# Patient Record
Sex: FEMALE | Race: ASIAN | NOT HISPANIC OR LATINO | URBAN - METROPOLITAN AREA
[De-identification: names, ages, dates, MRNs, and addresses within clinical notes are randomized per-mention and may not be internally consistent; named-entity substitution may affect disease eponyms.]

---

## 2017-11-28 ENCOUNTER — OUTPATIENT (OUTPATIENT)
Dept: OUTPATIENT SERVICES | Facility: HOSPITAL | Age: 27
LOS: 1 days | Discharge: HOME | End: 2017-11-28

## 2019-06-10 ENCOUNTER — EMERGENCY (EMERGENCY)
Facility: HOSPITAL | Age: 29
LOS: 1 days | Discharge: HOME | End: 2019-06-10
Attending: EMERGENCY MEDICINE | Admitting: EMERGENCY MEDICINE
Payer: COMMERCIAL

## 2019-06-10 VITALS
RESPIRATION RATE: 189 BRPM | HEART RATE: 110 BPM | OXYGEN SATURATION: 98 % | DIASTOLIC BLOOD PRESSURE: 90 MMHG | TEMPERATURE: 98 F | SYSTOLIC BLOOD PRESSURE: 135 MMHG

## 2019-06-10 VITALS
HEART RATE: 82 BPM | OXYGEN SATURATION: 99 % | TEMPERATURE: 97 F | DIASTOLIC BLOOD PRESSURE: 82 MMHG | SYSTOLIC BLOOD PRESSURE: 140 MMHG | RESPIRATION RATE: 16 BRPM
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
## 2019-06-10 PROCEDURE — 99284 EMERGENCY DEPT VISIT MOD MDM: CPT

## 2019-06-10 RX ORDER — SODIUM CHLORIDE 9 MG/ML
3 INJECTION INTRAMUSCULAR; INTRAVENOUS; SUBCUTANEOUS ONCE
Refills: 0 | Status: COMPLETED | OUTPATIENT
Start: 2019-06-10 | End: 2019-06-10

## 2019-06-10 RX ORDER — DIPHENHYDRAMINE HCL 50 MG
1 CAPSULE ORAL
Qty: 12 | Refills: 0
Start: 2019-06-10 | End: 2019-06-12

## 2019-06-10 RX ORDER — ONDANSETRON 8 MG/1
1 TABLET, FILM COATED ORAL
Qty: 12 | Refills: 0
Start: 2019-06-10 | End: 2019-06-12

## 2019-06-10 RX ORDER — FAMOTIDINE 10 MG/ML
1 INJECTION INTRAVENOUS
Qty: 10 | Refills: 0
Start: 2019-06-10 | End: 2019-06-14

## 2019-06-10 RX ORDER — DIPHENHYDRAMINE HCL 50 MG
25 CAPSULE ORAL ONCE
Refills: 0 | Status: COMPLETED | OUTPATIENT
Start: 2019-06-10 | End: 2019-06-10

## 2019-06-10 RX ORDER — ONDANSETRON 8 MG/1
4 TABLET, FILM COATED ORAL ONCE
Refills: 0 | Status: COMPLETED | OUTPATIENT
Start: 2019-06-10 | End: 2019-06-10

## 2019-06-10 RX ORDER — SODIUM CHLORIDE 9 MG/ML
1000 INJECTION INTRAMUSCULAR; INTRAVENOUS; SUBCUTANEOUS ONCE
Refills: 0 | Status: COMPLETED | OUTPATIENT
Start: 2019-06-10 | End: 2019-06-10

## 2019-06-10 RX ORDER — FAMOTIDINE 10 MG/ML
20 INJECTION INTRAVENOUS ONCE
Refills: 0 | Status: COMPLETED | OUTPATIENT
Start: 2019-06-10 | End: 2019-06-10

## 2019-06-10 RX ADMIN — Medication 125 MILLIGRAM(S): at 13:47

## 2019-06-10 RX ADMIN — SODIUM CHLORIDE 3 MILLILITER(S): 9 INJECTION INTRAMUSCULAR; INTRAVENOUS; SUBCUTANEOUS at 14:00

## 2019-06-10 RX ADMIN — ONDANSETRON 4 MILLIGRAM(S): 8 TABLET, FILM COATED ORAL at 13:48

## 2019-06-10 RX ADMIN — Medication 25 MILLIGRAM(S): at 13:20

## 2019-06-10 RX ADMIN — SODIUM CHLORIDE 1000 MILLILITER(S): 9 INJECTION INTRAMUSCULAR; INTRAVENOUS; SUBCUTANEOUS at 14:30

## 2019-06-10 RX ADMIN — FAMOTIDINE 20 MILLIGRAM(S): 10 INJECTION INTRAVENOUS at 13:47

## 2019-06-10 RX ADMIN — SODIUM CHLORIDE 1000 MILLILITER(S): 9 INJECTION INTRAMUSCULAR; INTRAVENOUS; SUBCUTANEOUS at 13:48

## 2019-06-10 NOTE — ED PROVIDER NOTE - OBJECTIVE STATEMENT
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 Patient reports she was eating a sandwich approximately 20 minutes PTA when her mouth began feeling tingly, and felt nauseous with mild redness to the skin. Denies any SOB, cough, wheeze, tongue swelling, vomiting, itching, or hives. States she asked the , who informed her that the sandwich contained nut products, and took 2 tabs 50mg Benadryl PTA. Patient currently feels nauseous. States she did not use an epi pen today, as she only uses it if throat becomes swollen. Also denies any recent changes in medications.

## 2019-06-10 NOTE — ED PROVIDER NOTE - CLINICAL SUMMARY MEDICAL DECISION MAKING FREE TEXT BOX
Findings consistent with mild allergic reaction. Will observe in ED for minimum of 2 hours, provide H1-H2 blockers, Zofran, and steroids. Inquired if patient has epi pen, and patient confirmed that she does but only uses it if her throat is swollen and has difficulty swallowing. No indication for epinephrine stick in ED. Findings consistent with mild allergic reaction. Will observe in ED for minimum of 2 hours, provide H1-H2 blockers and steroids. Inquired if patient has epi pen, and patient confirmed that she does but only uses it if her throat is swollen and has difficulty swallowing. No indication for epinephrine stick in ED.

## 2019-06-10 NOTE — ED ADULT TRIAGE NOTE - CHIEF COMPLAINT QUOTE
Allergic reaction to nuts pt. denies any airway involvement at this time. Took 2 benadryl prior to arrival

## 2019-06-10 NOTE — ED PROVIDER NOTE - ENMT, MLM
Airway patent, Nasal mucosa clear. Mouth with normal mucosa. Throat has no vesicles, no oropharyngeal exudates and uvula is midline and normal. Mild circumoral angioedema. Tongue is normal. No stridor, no pooling of secretions.

## 2019-06-10 NOTE — ED PROVIDER NOTE - PROGRESS NOTE DETAILS
Patient with 1 episode of emesis in the ED. Currently reports feeling better, rash has faded, symptoms have resolved. Will continue to observe patient pending half-life of meds. Patient remains hemodynamically stable, with O2 sat of 100% on RA. all symptoms resolved, observed and no further complaints. Stable for dc.

## 2019-06-14 DIAGNOSIS — T78.1XXA OTHER ADVERSE FOOD REACTIONS, NOT ELSEWHERE CLASSIFIED, INITIAL ENCOUNTER: ICD-10-CM

## 2019-06-14 DIAGNOSIS — R11.0 NAUSEA: ICD-10-CM

## 2019-08-13 ENCOUNTER — INPATIENT (INPATIENT)
Facility: HOSPITAL | Age: 29
LOS: 0 days | Discharge: HOME | DRG: 872 | End: 2019-08-14
Payer: COMMERCIAL

## 2019-08-13 VITALS
HEART RATE: 101 BPM | RESPIRATION RATE: 18 BRPM | DIASTOLIC BLOOD PRESSURE: 85 MMHG | WEIGHT: 175.05 LBS | SYSTOLIC BLOOD PRESSURE: 135 MMHG | TEMPERATURE: 99 F | OXYGEN SATURATION: 97 %

## 2019-08-13 DIAGNOSIS — G80.9 CEREBRAL PALSY, UNSPECIFIED: ICD-10-CM

## 2019-08-13 DIAGNOSIS — K52.9 NONINFECTIVE GASTROENTERITIS AND COLITIS, UNSPECIFIED: ICD-10-CM

## 2019-08-13 DIAGNOSIS — Z98.890 OTHER SPECIFIED POSTPROCEDURAL STATES: Chronic | ICD-10-CM

## 2019-08-13 DIAGNOSIS — R63.8 OTHER SYMPTOMS AND SIGNS CONCERNING FOOD AND FLUID INTAKE: ICD-10-CM

## 2019-08-13 DIAGNOSIS — A41.9 SEPSIS, UNSPECIFIED ORGANISM: ICD-10-CM

## 2019-08-13 DIAGNOSIS — F32.9 MAJOR DEPRESSIVE DISORDER, SINGLE EPISODE, UNSPECIFIED: ICD-10-CM

## 2019-08-13 DIAGNOSIS — Z91.89 OTHER SPECIFIED PERSONAL RISK FACTORS, NOT ELSEWHERE CLASSIFIED: ICD-10-CM

## 2019-08-13 LAB
ALBUMIN SERPL ELPH-MCNC: 3.7 G/DL — SIGNIFICANT CHANGE UP (ref 3.3–5)
ALP SERPL-CCNC: 90 U/L — SIGNIFICANT CHANGE UP (ref 40–120)
ALT FLD-CCNC: SIGNIFICANT CHANGE UP U/L (ref 10–45)
ANION GAP SERPL CALC-SCNC: 15 MMOL/L — SIGNIFICANT CHANGE UP (ref 5–17)
APPEARANCE UR: CLEAR — SIGNIFICANT CHANGE UP
AST SERPL-CCNC: SIGNIFICANT CHANGE UP U/L (ref 10–40)
BASE EXCESS BLDV CALC-SCNC: -14.8 MMOL/L — SIGNIFICANT CHANGE UP
BASOPHILS # BLD AUTO: 0.03 K/UL — SIGNIFICANT CHANGE UP (ref 0–0.2)
BASOPHILS NFR BLD AUTO: 0.2 % — SIGNIFICANT CHANGE UP (ref 0–2)
BILIRUB SERPL-MCNC: <0.2 MG/DL — SIGNIFICANT CHANGE UP (ref 0.2–1.2)
BILIRUB UR-MCNC: NEGATIVE — SIGNIFICANT CHANGE UP
BUN SERPL-MCNC: 9 MG/DL — SIGNIFICANT CHANGE UP (ref 7–23)
CA-I SERPL-SCNC: 0.74 MMOL/L — LOW (ref 1.12–1.3)
CALCIUM SERPL-MCNC: 8.8 MG/DL — SIGNIFICANT CHANGE UP (ref 8.4–10.5)
CHLORIDE SERPL-SCNC: 109 MMOL/L — HIGH (ref 96–108)
CO2 SERPL-SCNC: 16 MMOL/L — LOW (ref 22–31)
COLOR SPEC: YELLOW — SIGNIFICANT CHANGE UP
CREAT SERPL-MCNC: 0.63 MG/DL — SIGNIFICANT CHANGE UP (ref 0.5–1.3)
DIFF PNL FLD: ABNORMAL
EOSINOPHIL # BLD AUTO: 0.04 K/UL — SIGNIFICANT CHANGE UP (ref 0–0.5)
EOSINOPHIL NFR BLD AUTO: 0.3 % — SIGNIFICANT CHANGE UP (ref 0–6)
GAS PNL BLDV: 140 MMOL/L — SIGNIFICANT CHANGE UP (ref 138–146)
GAS PNL BLDV: SIGNIFICANT CHANGE UP
GLUCOSE SERPL-MCNC: 124 MG/DL — HIGH (ref 70–99)
GLUCOSE UR QL: NEGATIVE — SIGNIFICANT CHANGE UP
HCG SERPL-ACNC: <0 MIU/ML — SIGNIFICANT CHANGE UP
HCG UR QL: NEGATIVE — SIGNIFICANT CHANGE UP
HCO3 BLDV-SCNC: 11 MMOL/L — LOW (ref 20–27)
HCT VFR BLD CALC: 41.6 % — SIGNIFICANT CHANGE UP (ref 34.5–45)
HGB BLD-MCNC: 13.4 G/DL — SIGNIFICANT CHANGE UP (ref 11.5–15.5)
IMM GRANULOCYTES NFR BLD AUTO: 0.3 % — SIGNIFICANT CHANGE UP (ref 0–1.5)
KETONES UR-MCNC: NEGATIVE — SIGNIFICANT CHANGE UP
LACTATE SERPL-SCNC: 0.8 MMOL/L — SIGNIFICANT CHANGE UP (ref 0.5–2)
LEUKOCYTE ESTERASE UR-ACNC: ABNORMAL
LIDOCAIN IGE QN: 64 U/L — HIGH (ref 7–60)
LYMPHOCYTES # BLD AUTO: 14.7 % — SIGNIFICANT CHANGE UP (ref 13–44)
LYMPHOCYTES # BLD AUTO: 2.11 K/UL — SIGNIFICANT CHANGE UP (ref 1–3.3)
MCHC RBC-ENTMCNC: 28.2 PG — SIGNIFICANT CHANGE UP (ref 27–34)
MCHC RBC-ENTMCNC: 32.2 GM/DL — SIGNIFICANT CHANGE UP (ref 32–36)
MCV RBC AUTO: 87.6 FL — SIGNIFICANT CHANGE UP (ref 80–100)
MONOCYTES # BLD AUTO: 0.88 K/UL — SIGNIFICANT CHANGE UP (ref 0–0.9)
MONOCYTES NFR BLD AUTO: 6.1 % — SIGNIFICANT CHANGE UP (ref 2–14)
NEUTROPHILS # BLD AUTO: 11.28 K/UL — HIGH (ref 1.8–7.4)
NEUTROPHILS NFR BLD AUTO: 78.4 % — HIGH (ref 43–77)
NITRITE UR-MCNC: NEGATIVE — SIGNIFICANT CHANGE UP
NRBC # BLD: 0 /100 WBCS — SIGNIFICANT CHANGE UP (ref 0–0)
PCO2 BLDV: 24 MMHG — LOW (ref 41–51)
PH BLDV: 7.27 — LOW (ref 7.32–7.43)
PH UR: 6.5 — SIGNIFICANT CHANGE UP (ref 5–8)
PLATELET # BLD AUTO: 403 K/UL — HIGH (ref 150–400)
PO2 BLDV: 53 MMHG — SIGNIFICANT CHANGE UP
POTASSIUM BLDV-SCNC: 2.4 MMOL/L — CRITICAL LOW (ref 3.5–4.9)
POTASSIUM SERPL-MCNC: SIGNIFICANT CHANGE UP MMOL/L (ref 3.5–5.3)
POTASSIUM SERPL-SCNC: SIGNIFICANT CHANGE UP MMOL/L (ref 3.5–5.3)
PROT SERPL-MCNC: 8 G/DL — SIGNIFICANT CHANGE UP (ref 6–8.3)
PROT UR-MCNC: NEGATIVE MG/DL — SIGNIFICANT CHANGE UP
RBC # BLD: 4.75 M/UL — SIGNIFICANT CHANGE UP (ref 3.8–5.2)
RBC # FLD: 12.2 % — SIGNIFICANT CHANGE UP (ref 10.3–14.5)
SAO2 % BLDV: 84 % — SIGNIFICANT CHANGE UP
SODIUM SERPL-SCNC: 140 MMOL/L — SIGNIFICANT CHANGE UP (ref 135–145)
SP GR SPEC: <=1.005 — SIGNIFICANT CHANGE UP (ref 1–1.03)
UROBILINOGEN FLD QL: 0.2 E.U./DL — SIGNIFICANT CHANGE UP
WBC # BLD: 14.38 K/UL — HIGH (ref 3.8–10.5)
WBC # FLD AUTO: 14.38 K/UL — HIGH (ref 3.8–10.5)

## 2019-08-13 PROCEDURE — 74177 CT ABD & PELVIS W/CONTRAST: CPT | Mod: 26

## 2019-08-13 PROCEDURE — 99223 1ST HOSP IP/OBS HIGH 75: CPT | Mod: GC

## 2019-08-13 PROCEDURE — 99285 EMERGENCY DEPT VISIT HI MDM: CPT

## 2019-08-13 RX ORDER — FLUOXETINE HCL 10 MG
1 CAPSULE ORAL
Qty: 0 | Refills: 0 | DISCHARGE

## 2019-08-13 RX ORDER — TOPIRAMATE 25 MG
50 TABLET ORAL DAILY
Refills: 0 | Status: DISCONTINUED | OUTPATIENT
Start: 2019-08-13 | End: 2019-08-14

## 2019-08-13 RX ORDER — QUETIAPINE FUMARATE 200 MG/1
100 TABLET, FILM COATED ORAL DAILY
Refills: 0 | Status: DISCONTINUED | OUTPATIENT
Start: 2019-08-13 | End: 2019-08-14

## 2019-08-13 RX ORDER — LURASIDONE HYDROCHLORIDE 40 MG/1
1 TABLET ORAL
Qty: 0 | Refills: 0 | DISCHARGE

## 2019-08-13 RX ORDER — SODIUM CHLORIDE 9 MG/ML
1000 INJECTION INTRAMUSCULAR; INTRAVENOUS; SUBCUTANEOUS ONCE
Refills: 0 | Status: COMPLETED | OUTPATIENT
Start: 2019-08-13 | End: 2019-08-13

## 2019-08-13 RX ORDER — DIAZEPAM 5 MG
5 TABLET ORAL ONCE
Refills: 0 | Status: DISCONTINUED | OUTPATIENT
Start: 2019-08-13 | End: 2019-08-13

## 2019-08-13 RX ORDER — TOPIRAMATE 25 MG
1 TABLET ORAL
Qty: 0 | Refills: 0 | DISCHARGE

## 2019-08-13 RX ORDER — SODIUM CHLORIDE 9 MG/ML
1000 INJECTION INTRAMUSCULAR; INTRAVENOUS; SUBCUTANEOUS
Refills: 0 | Status: DISCONTINUED | OUTPATIENT
Start: 2019-08-13 | End: 2019-08-14

## 2019-08-13 RX ORDER — IOHEXOL 300 MG/ML
30 INJECTION, SOLUTION INTRAVENOUS ONCE
Refills: 0 | Status: COMPLETED | OUTPATIENT
Start: 2019-08-13 | End: 2019-08-13

## 2019-08-13 RX ORDER — FLUOXETINE HCL 10 MG
20 CAPSULE ORAL DAILY
Refills: 0 | Status: DISCONTINUED | OUTPATIENT
Start: 2019-08-13 | End: 2019-08-14

## 2019-08-13 RX ORDER — LURASIDONE HYDROCHLORIDE 40 MG/1
40 TABLET ORAL DAILY
Refills: 0 | Status: DISCONTINUED | OUTPATIENT
Start: 2019-08-13 | End: 2019-08-14

## 2019-08-13 RX ORDER — PIPERACILLIN AND TAZOBACTAM 4; .5 G/20ML; G/20ML
3.38 INJECTION, POWDER, LYOPHILIZED, FOR SOLUTION INTRAVENOUS EVERY 6 HOURS
Refills: 0 | Status: DISCONTINUED | OUTPATIENT
Start: 2019-08-14 | End: 2019-08-14

## 2019-08-13 RX ORDER — POTASSIUM CHLORIDE 20 MEQ
40 PACKET (EA) ORAL ONCE
Refills: 0 | Status: COMPLETED | OUTPATIENT
Start: 2019-08-13 | End: 2019-08-13

## 2019-08-13 RX ORDER — POTASSIUM CHLORIDE 20 MEQ
10 PACKET (EA) ORAL ONCE
Refills: 0 | Status: COMPLETED | OUTPATIENT
Start: 2019-08-13 | End: 2019-08-13

## 2019-08-13 RX ORDER — QUETIAPINE FUMARATE 200 MG/1
1 TABLET, FILM COATED ORAL
Qty: 0 | Refills: 0 | DISCHARGE

## 2019-08-13 RX ORDER — POTASSIUM CHLORIDE 20 MEQ
10 PACKET (EA) ORAL ONCE
Refills: 0 | Status: COMPLETED | OUTPATIENT
Start: 2019-08-13 | End: 2019-08-14

## 2019-08-13 RX ORDER — METOCLOPRAMIDE HCL 10 MG
10 TABLET ORAL ONCE
Refills: 0 | Status: COMPLETED | OUTPATIENT
Start: 2019-08-13 | End: 2019-08-13

## 2019-08-13 RX ORDER — PIPERACILLIN AND TAZOBACTAM 4; .5 G/20ML; G/20ML
3.38 INJECTION, POWDER, LYOPHILIZED, FOR SOLUTION INTRAVENOUS ONCE
Refills: 0 | Status: COMPLETED | OUTPATIENT
Start: 2019-08-13 | End: 2019-08-13

## 2019-08-13 RX ADMIN — Medication 10 MILLIGRAM(S): at 18:23

## 2019-08-13 RX ADMIN — IOHEXOL 30 MILLILITER(S): 300 INJECTION, SOLUTION INTRAVENOUS at 18:27

## 2019-08-13 RX ADMIN — PIPERACILLIN AND TAZOBACTAM 200 GRAM(S): 4; .5 INJECTION, POWDER, LYOPHILIZED, FOR SOLUTION INTRAVENOUS at 22:47

## 2019-08-13 RX ADMIN — Medication 100 MILLIEQUIVALENT(S): at 22:56

## 2019-08-13 RX ADMIN — Medication 40 MILLIEQUIVALENT(S): at 23:02

## 2019-08-13 RX ADMIN — SODIUM CHLORIDE 1000 MILLILITER(S): 9 INJECTION INTRAMUSCULAR; INTRAVENOUS; SUBCUTANEOUS at 18:27

## 2019-08-13 RX ADMIN — SODIUM CHLORIDE 1000 MILLILITER(S): 9 INJECTION INTRAMUSCULAR; INTRAVENOUS; SUBCUTANEOUS at 22:47

## 2019-08-13 RX ADMIN — SODIUM CHLORIDE 1000 MILLILITER(S): 9 INJECTION INTRAMUSCULAR; INTRAVENOUS; SUBCUTANEOUS at 23:04

## 2019-08-13 RX ADMIN — Medication 5 MILLIGRAM(S): at 22:47

## 2019-08-13 NOTE — H&P ADULT - NSHPSOCIALHISTORY_GEN_ALL_CORE
Denies cigarette smoking  Occasional wine  Uses CBD oil occasionally, denies other recreational drug use

## 2019-08-13 NOTE — H&P ADULT - PROBLEM SELECTOR PLAN 1
CT abdomen/pelvis with PO and IV contrast: Wall thickening of the terminal ileum as well as a second short segment of small bowel in the mid right abdomen at the level of the umbilicus consistent with terminal ileitis, infectious versus inflammatory.   Patient reports 2  -c/w Zosyn for GI coverage (patient with cephalosporin allergy)  -GI following appreciate recs  -c/w IV fluid hydration CT abdomen/pelvis with PO and IV contrast: Wall thickening of the terminal ileum as well as a second short segment of small bowel in the mid right abdomen at the level of the umbilicus consistent with terminal ileitis, infectious versus inflammatory.   Patient with no formal diagnosis of Crohn's disease and reports having 4 colonoscopies in the past. However, no recent GI follow-up. Given fever, and leukocytosis would suggest more of infectious etiology, but also per history patient has low risk for infectious colitis, and ilelitis also suggests common location for Crohn's disease.  Lactate WNL at 0.8, but obtained after 1L of NS. Low clinical concern for bowel ischemia.   -c/w Zosyn for GI organism coverage (patient with  allergy levofloxacin, sulfa, erythromycin and Ceclor all causing hives)  -IV fluid hydration and correction of electrolyte   -GI following appreciate recs  -c/w IV fluid hydration CT abdomen/pelvis with PO and IV contrast: Wall thickening of the terminal ileum as well as a second short segment of small bowel in the mid right abdomen at the level of the umbilicus consistent with terminal ileitis, infectious versus inflammatory.   Patient with no formal diagnosis of Crohn's disease and reports having 4 colonoscopies in the past. However, no recent GI follow-up. Given fever, and leukocytosis would suggest more of infectious etiology, but also per history patient has low risk for infectious colitis, and ilelitis also suggests common location for Crohn's disease.  Low concern for C diff as patient with no recent diarrhea and no antibiotic exposure  Lactate WNL at 0.8, but obtained after 1L of NS. Low clinical concern for bowel ischemia.   -c/w Zosyn for GI organism coverage (patient with  allergy levofloxacin, sulfa, erythromycin and Ceclor all causing hives)  -IV fluid hydration and correction of electrolyte   -c/w IV fluid hydration  -GI PCR (collet on next BM)  -GI following appreciate recs

## 2019-08-13 NOTE — H&P ADULT - PROBLEM SELECTOR PLAN 4
Takes Seroquel, Prozac, Latuda, and Topamax at home. Likely mild, no significant deficits.  -continue to monitor Patient with VBG showing pH 7.27, pCO2 24; HCO3 16. Suggesting non-anion gap metabolic acidosis with respiratory compensation. This may be 2/2 diarrhea, but patient reports no recent diarrhea.   -continue to monitor w/ labs.   -IV fluid hydration

## 2019-08-13 NOTE — ED PROVIDER NOTE - CARE PLAN
Principal Discharge DX:	Abdominal pain Principal Discharge DX:	Crohn's colitis, without complications

## 2019-08-13 NOTE — H&P ADULT - NSHPLABSRESULTS_GEN_ALL_CORE
(08-13 @ 17:59)                      13.4  14.38 )-----------( 403                 41.6    Neutrophils = 11.28 (78.4%)  Lymphocytes = 2.11 (14.7%)  Eosinophils = 0.04 (0.3%)  Basophils = 0.03 (0.2%)  Monocytes = 0.88 (6.1%)  Bands = --%    08-13    140  |  109<H>  |  9   ----------------------------<  124<H>  SEE NOTE   |  16<L>  |  0.63    Ca    8.8      13 Aug 2019 17:59    TPro  8.0  /  Alb  3.7  /  TBili  <0.2  /  DBili  x   /  AST  SEE NOTE  /  ALT  SEE NOTE  /  AlkPhos  90  08-13        Urinalysis Basic - ( 13 Aug 2019 19:37 )    Color: Yellow / Appearance: Clear / SG: <=1.005 / pH: x  Gluc: x / Ketone: NEGATIVE  / Bili: Negative / Urobili: 0.2 E.U./dL   Blood: x / Protein: NEGATIVE mg/dL / Nitrite: NEGATIVE   Leuk Esterase: Trace / RBC: < 5 /HPF / WBC < 5 /HPF   Sq Epi: x / Non Sq Epi: Moderate /HPF / Bacteria: Present /HPF        CT abdomen/pelvis with PO and IV contrast: Wall thickening of the terminal ileum as well as a second short segment of small bowel in the mid right abdomen at the level of the umbilicus consistent with terminal ileitis, infectious versus inflammatory. Though not specific involvement of the terminal ileum can be seen with Crohn's colitis.

## 2019-08-13 NOTE — H&P ADULT - NSHPPHYSICALEXAM_GEN_ALL_CORE
GENERAL: No acute distress. Appears stated age.  HEENT:  Atraumatic, Normocephalic, conjunctiva and sclera clear, oropharynx clear, dry mucous membranes   NECK: Supple  CHEST: no gross deformities   LUNG: Clear to auscultation bilaterally; No wheezing, rales, rhonchi, or stridor  HEART: Regular rate and rhythm; S1 and S2 audible; No murmurs, rubs, or gallops  ABDOMEN: Soft, Nontender, Nondistended; Bowel sounds present in all four quadrants  EXTREMITIES:  2+ Peripheral Pulses bilaterally, No clubbing, cyanosis, or edema  NEUROLOGY: awake, alert, oriented x3; grossly intact with no focal deficits   SKIN: poor skin turgor GENERAL: Young obese female. No acute distress. Appears stated age.  HEENT:  Atraumatic, Normocephalic, conjunctiva and sclera clear, oropharynx clear, dry mucous membranes   NECK: Supple  CHEST: no gross deformities   LUNG: Clear to auscultation bilaterally; No wheezing, rales, rhonchi, or stridor  HEART: Regular rate and rhythm; S1 and S2 audible; No murmurs, rubs, or gallops  ABDOMEN: Soft, protuberant, moderate TTP in LUQ and mild TTP in LLQ; dimished Bowel sounds present in all four quadrants; no rebound tenderness; no hepatosplenomegaly; negative Melo sign   EXTREMITIES:  2+ Peripheral Pulses bilaterally, No clubbing, cyanosis, or edema  NEUROLOGY: awake, alert, oriented x3; grossly intact with no focal deficits   SKIN: poor skin turgor

## 2019-08-13 NOTE — H&P ADULT - PROBLEM SELECTOR PLAN 6
1) PCP Contacted on Admission: (Y/N) --> Name & Phone #:  2) Date of Contact with PCP:  3) PCP Contacted at Discharge: (Y/N, N/A)  4) Summary of Handoff Given to PCP:   5) Post-Discharge Appointment Date and Location: 1) PCP Contacted on Admission: (Y/N) --> Name & Phone #: MARILOU RAE [phone]         2) Date of Contact with PCP:  3) PCP Contacted at Discharge: (Y/N, N/A)  4) Summary of Handoff Given to PCP:   5) Post-Discharge Appointment Date and Location: Fluids: IV NS hydration  Electrolytes: monitor and correct as needed  Nutrition: Regular diet     Code: Full code  DVT prophylaxis: low risk, no pharmacologic ppx needed (Padua score = 2)  GI prophylaxis: not needed  Dispo: admit to Medicine

## 2019-08-13 NOTE — H&P ADULT - HISTORY OF PRESENT ILLNESS
29 year old felae with cerebral palsy, depression/anxiety presents c/o diffuse mid abd pain for the past 3 days.  Patient with chronic history of colitis. Has had 4 colonoscopics in the past for suspected Crohn's colitis but never formally diagnosed with Crohn's.     Pt stating pain mostly in mid abdomen, sometimes moving toward left side.  Pt reports fever as high as 102.3 F this morning which has been intermittent for the past 2 days.  Pt denies n/v/d, dysuria, vaginal discharge.    In the ED:  Initial vital signs: T: 98.7 F, HR: 101, BP: 135/85, R: 18, SpO2: 97% on RA  Labs: significant for WBC 14.38 with 78.4% neutrophis, HCO3 16, UA negative for UTI, lactate 0.8 (but obtained after 1 L fluids)   Imaging:  CT abdomen/pelvis w/ PO and IV contrast:  Wall thickening of the terminal ileum as well as a second short segment   of small bowel in the mid right abdomen at the level of the umbilicus consistent with terminal ileitis, infectious versus inflammatory.   CXR:   EKG:   Medications: Zosyn 3.375 g x1, Reglan 10 mg IV x 1, diazepam 5 mg PO x1, 2L NS bolus   Consults: GI - admit for IV antibiotics and IVF, will evaluate in AM 29 year old felae with cerebral palsy, depression/anxiety presents c/o diffuse mid abd pain for the past 3 days.  Patient with chronic history of colitis. Has had 4 colonoscopics in the past for suspected Crohn's colitis but never formally diagnosed with Crohn's.     Pt stating pain mostly in mid abdomen, sometimes moving toward left side.  Pt reports fever as high as 102.3 F this morning which has been intermittent for the past 2 days.  Pt denies n/v/d, dysuria, vaginal discharge.    In the ED:  Initial vital signs: T: 98.7 F, HR: 101, BP: 135/85, R: 18, SpO2: 97% on RA  Labs: significant for WBC 14.38 with 78.4% neutrophils, HCO3 16, UA negative for UTI, lactate 0.8 (but obtained after 1 L fluids); VBG with pH 7.27, pCO2 24  Imaging:  CT abdomen/pelvis w/ PO and IV contrast:  Wall thickening of the terminal ileum as well as a second short segment   of small bowel in the mid right abdomen at the level of the umbilicus consistent with terminal ileitis, infectious versus inflammatory.   CXR:   EKG:   Medications: Zosyn 3.375 g x1, Reglan 10 mg IV x 1, diazepam 5 mg PO x1, 2L NS bolus   Consults: GI - admit for IV antibiotics and IVF, will evaluate in AM 29 year old female with cerebral palsy, depression/anxiety, history of recurrent diarrhea presenting with 5 days of abdominal pain. Patient with chronic history since childhood of episodes where she gets large amounts of diarrhea, followed in between by periods of normal stools  Has had 4 colonoscopics in the past for suspected Crohn's colitis but never formally diagnosed with Crohn's. Unknown family history as she is adopted. Reports last colonoscopy and GI followup was around 5 years ago with provider in [place] but no GI follow-up since then as her symptoms generally better controlled. However, since Friday, patient reports intermittent episodes of abdominal pain and cramps. Pain usually localized and non-radiating, but location changes. Initially mid-epigastric, then lower abdomen, and then left quadrant. Pain not alleviated or worsened after a meal or position. Patient also with associated fevers up to 102 F at home, for which she took Advil with alleviation of fevers. Denies any recent diarrhea, and says she fells more constipated with last BM yesterday morning consisting of normal stool. Patient passing flatus. Denies any nausea or vomiting, but reports slightly decreased appetite and has been drinking soup since Saturday. Denies ever having any blood in her stools or any dark, tarry tools. Also denies recent CP, SOB, new cough, dysuria, hematuria, hematemesis Denies any recent travel outside [place], no significant dietary changes recent, no sick contacts with similar symptoms. Denies any recent antibiotic use. Reports generalized headache. Reports having normal MPs.     In the ED:  Initial vital signs: T: 98.7 F, HR: 101, BP: 135/85, R: 18, SpO2: 97% on RA  Labs: significant for WBC 14.38 with 78.4% neutrophils, HCO3 16, UA negative for UTI, lactate 0.8 (but obtained after 1 L fluids); VBG with pH 7.27, pCO2 24  Imaging:  CT abdomen/pelvis w/ PO and IV contrast:  Wall thickening of the terminal ileum as well as a second short segment   of small bowel in the mid right abdomen at the level of the umbilicus consistent with terminal ileitis, infectious versus inflammatory.   CXR:   EKG:   Medications: Zosyn 3.375 g x1, Reglan 10 mg IV x 1, diazepam 5 mg PO x1, 2L NS bolus   Consults: GI - admit for IV antibiotics and IVF, will evaluate in AM

## 2019-08-13 NOTE — H&P ADULT - PROBLEM SELECTOR PLAN 5
Fluids: not needed  Electrolytes: monitor and correct as needed  Nutrition: Regular diet     Code: Full code  DVT prophylaxis: low  GI prophylaxis: not needed  Dispo: admit to Medicine Fluids: IV NS hydration  Electrolytes: monitor and correct as needed  Nutrition: Regular diet     Code: Full code  DVT prophylaxis: low risk, no pharmacologic ppx needed (Padua score = 2)  GI prophylaxis: not needed  Dispo: admit to Medicine Likely mild, no significant deficits.  -continue to monitor

## 2019-08-13 NOTE — H&P ADULT - PROBLEM SELECTOR PLAN 7
1) PCP Contacted on Admission: (Y/N) --> Name & Phone #: MARILOU RAE [phone]         2) Date of Contact with PCP:  3) PCP Contacted at Discharge: (Y/N, N/A)  4) Summary of Handoff Given to PCP:   5) Post-Discharge Appointment Date and Location:

## 2019-08-13 NOTE — ED PROVIDER NOTE - CLINICAL SUMMARY MEDICAL DECISION MAKING FREE TEXT BOX
28 y/o f hx CP presents c/o diffuse abd pain, fever x 3 days; afebrile in ED although took advil prior to arrival, no GI sx, mild tenderness on exam.  Noted to have leukocytosis, neg urine, and given fever will CT to eval for appendicitis, possible enteritis/colitis, diverticulitis.  Will f/u CT and reassess.

## 2019-08-13 NOTE — ED PROVIDER NOTE - ATTENDING CONTRIBUTION TO CARE
29 F hx of CP, "GI issues" in past s/p multiple colonoscopies in past p/w LUQ pain, fever x several days, no nvdc.  Pt dry appearing, TTP LUQ, mild, no r/g no masses.  MORSE with labs, CT, results noted, tx with ivf, abx, k replacement.  Pt reuiqres admit for GI consult and IVF and abx.  LIkely new onset Crohns flare.

## 2019-08-13 NOTE — ED PROVIDER NOTE - OBJECTIVE STATEMENT
30 y/o f hx CP presents c/o diffuse mid abd pain for the past 3 days.  Pt stating pain mostly in mid abdomen, sometimes moving toward left side.  Pt reports fever as high as 102 this morning which has been intermittent for the past 2 days.  Pt denies n/v/d, dysuria, vaginal discharge, all other ROS negative.

## 2019-08-13 NOTE — H&P ADULT - PROBLEM SELECTOR PLAN 3
History of anxiety and Depression. Stable.  Takes Seroquel, Prozac, Latuda, and Topamax at home.  -c/w with home medications.

## 2019-08-13 NOTE — H&P ADULT - PROBLEM SELECTOR PLAN 2
Patient meeting 2/4 SIRS criteria in ED. HR > 90 and 14.38. Also with reported fevers up to to 102.3 F at home.   Source may be 2/2 infectious colitis. UA negative for UTI.  s/p 2L NS bolus in ED.  -fu BCx  -Management as above Patient meeting 2/4 SIRS criteria in ED. HR > 90 and 14.38. Also with reported fevers up to to 102.3 F at home.   Source may be 2/2 infectious ileitis, but patient with no clear risk factors for infectious colitis. UA negative for UTI.  Lactate WNL at 0.8, but obtained after 1L of NS   s/p 2L NS bolus in ED.  -fu BCx  -Management as above

## 2019-08-13 NOTE — ED PROVIDER NOTE - PROGRESS NOTE DETAILS
pt reassessed still has pain, dry mmm, VSS.  added vbg, lactate, given addn fluids and now abx.  allergic to many abx, pt reports can tolerate penicillin.  spoke with GI fellow kelly thakkar admit, will follow from GI in AM and tx as possible chron's colitis.

## 2019-08-13 NOTE — H&P ADULT - ASSESSMENT
29 year old female with cerebral palsy, depression/anxiety, history of recurrent diarrhea presenting with 5 days of abdominal pain. Admitted for management of ileitis, infectious versus inflammatory etiology.

## 2019-08-13 NOTE — H&P ADULT - NSICDXPASTSURGICALHX_GEN_ALL_CORE_FT
PAST SURGICAL HISTORY:  No significant past surgical history PAST SURGICAL HISTORY:  H/O knee surgery     S/P tendon repair

## 2019-08-14 VITALS
DIASTOLIC BLOOD PRESSURE: 78 MMHG | HEART RATE: 100 BPM | OXYGEN SATURATION: 96 % | RESPIRATION RATE: 19 BRPM | SYSTOLIC BLOOD PRESSURE: 112 MMHG | TEMPERATURE: 98 F

## 2019-08-14 DIAGNOSIS — E87.2 ACIDOSIS: ICD-10-CM

## 2019-08-14 DIAGNOSIS — K52.9 NONINFECTIVE GASTROENTERITIS AND COLITIS, UNSPECIFIED: ICD-10-CM

## 2019-08-14 PROBLEM — F41.9 ANXIETY DISORDER, UNSPECIFIED: Chronic | Status: ACTIVE | Noted: 2019-06-10

## 2019-08-14 PROBLEM — Z91.018 ALLERGY TO OTHER FOODS: Chronic | Status: ACTIVE | Noted: 2019-06-10

## 2019-08-14 PROBLEM — F32.9 MAJOR DEPRESSIVE DISORDER, SINGLE EPISODE, UNSPECIFIED: Chronic | Status: ACTIVE | Noted: 2019-06-10

## 2019-08-14 PROBLEM — G80.9 CEREBRAL PALSY, UNSPECIFIED: Chronic | Status: ACTIVE | Noted: 2019-06-10

## 2019-08-14 LAB
ALBUMIN SERPL ELPH-MCNC: 3.2 G/DL — LOW (ref 3.3–5)
ALBUMIN SERPL ELPH-MCNC: 3.7 G/DL — SIGNIFICANT CHANGE UP (ref 3.3–5)
ALP SERPL-CCNC: 86 U/L — SIGNIFICANT CHANGE UP (ref 40–120)
ALP SERPL-CCNC: 99 U/L — SIGNIFICANT CHANGE UP (ref 40–120)
ALT FLD-CCNC: 31 U/L — SIGNIFICANT CHANGE UP (ref 10–45)
ALT FLD-CCNC: 36 U/L — SIGNIFICANT CHANGE UP (ref 10–45)
ANION GAP SERPL CALC-SCNC: 13 MMOL/L — SIGNIFICANT CHANGE UP (ref 5–17)
ANION GAP SERPL CALC-SCNC: 14 MMOL/L — SIGNIFICANT CHANGE UP (ref 5–17)
ANION GAP SERPL CALC-SCNC: 15 MMOL/L — SIGNIFICANT CHANGE UP (ref 5–17)
AST SERPL-CCNC: 19 U/L — SIGNIFICANT CHANGE UP (ref 10–40)
AST SERPL-CCNC: 22 U/L — SIGNIFICANT CHANGE UP (ref 10–40)
BASE EXCESS BLDV CALC-SCNC: -6.2 MMOL/L — SIGNIFICANT CHANGE UP
BASOPHILS # BLD AUTO: 0.03 K/UL — SIGNIFICANT CHANGE UP (ref 0–0.2)
BASOPHILS NFR BLD AUTO: 0.2 % — SIGNIFICANT CHANGE UP (ref 0–2)
BILIRUB SERPL-MCNC: 0.2 MG/DL — SIGNIFICANT CHANGE UP (ref 0.2–1.2)
BILIRUB SERPL-MCNC: 0.2 MG/DL — SIGNIFICANT CHANGE UP (ref 0.2–1.2)
BUN SERPL-MCNC: 4 MG/DL — LOW (ref 7–23)
BUN SERPL-MCNC: 5 MG/DL — LOW (ref 7–23)
BUN SERPL-MCNC: 7 MG/DL — SIGNIFICANT CHANGE UP (ref 7–23)
CALCIUM SERPL-MCNC: 8 MG/DL — LOW (ref 8.4–10.5)
CALCIUM SERPL-MCNC: 8.3 MG/DL — LOW (ref 8.4–10.5)
CALCIUM SERPL-MCNC: 8.7 MG/DL — SIGNIFICANT CHANGE UP (ref 8.4–10.5)
CHLORIDE SERPL-SCNC: 107 MMOL/L — SIGNIFICANT CHANGE UP (ref 96–108)
CHLORIDE SERPL-SCNC: 107 MMOL/L — SIGNIFICANT CHANGE UP (ref 96–108)
CHLORIDE SERPL-SCNC: 109 MMOL/L — HIGH (ref 96–108)
CO2 SERPL-SCNC: 15 MMOL/L — LOW (ref 22–31)
CO2 SERPL-SCNC: 16 MMOL/L — LOW (ref 22–31)
CO2 SERPL-SCNC: 17 MMOL/L — LOW (ref 22–31)
CREAT SERPL-MCNC: 0.59 MG/DL — SIGNIFICANT CHANGE UP (ref 0.5–1.3)
CREAT SERPL-MCNC: 0.6 MG/DL — SIGNIFICANT CHANGE UP (ref 0.5–1.3)
CREAT SERPL-MCNC: 0.65 MG/DL — SIGNIFICANT CHANGE UP (ref 0.5–1.3)
EOSINOPHIL # BLD AUTO: 0.02 K/UL — SIGNIFICANT CHANGE UP (ref 0–0.5)
EOSINOPHIL NFR BLD AUTO: 0.2 % — SIGNIFICANT CHANGE UP (ref 0–6)
GLUCOSE SERPL-MCNC: 88 MG/DL — SIGNIFICANT CHANGE UP (ref 70–99)
GLUCOSE SERPL-MCNC: 89 MG/DL — SIGNIFICANT CHANGE UP (ref 70–99)
GLUCOSE SERPL-MCNC: 93 MG/DL — SIGNIFICANT CHANGE UP (ref 70–99)
HCO3 BLDV-SCNC: 16 MMOL/L — LOW (ref 20–27)
HCT VFR BLD CALC: 37.8 % — SIGNIFICANT CHANGE UP (ref 34.5–45)
HGB BLD-MCNC: 12 G/DL — SIGNIFICANT CHANGE UP (ref 11.5–15.5)
IMM GRANULOCYTES NFR BLD AUTO: 0.4 % — SIGNIFICANT CHANGE UP (ref 0–1.5)
LYMPHOCYTES # BLD AUTO: 2.7 K/UL — SIGNIFICANT CHANGE UP (ref 1–3.3)
LYMPHOCYTES # BLD AUTO: 20.4 % — SIGNIFICANT CHANGE UP (ref 13–44)
MAGNESIUM SERPL-MCNC: 1.8 MG/DL — SIGNIFICANT CHANGE UP (ref 1.6–2.6)
MCHC RBC-ENTMCNC: 28 PG — SIGNIFICANT CHANGE UP (ref 27–34)
MCHC RBC-ENTMCNC: 31.7 GM/DL — LOW (ref 32–36)
MCV RBC AUTO: 88.3 FL — SIGNIFICANT CHANGE UP (ref 80–100)
MONOCYTES # BLD AUTO: 0.86 K/UL — SIGNIFICANT CHANGE UP (ref 0–0.9)
MONOCYTES NFR BLD AUTO: 6.5 % — SIGNIFICANT CHANGE UP (ref 2–14)
NEUTROPHILS # BLD AUTO: 9.56 K/UL — HIGH (ref 1.8–7.4)
NEUTROPHILS NFR BLD AUTO: 72.3 % — SIGNIFICANT CHANGE UP (ref 43–77)
NRBC # BLD: 0 /100 WBCS — SIGNIFICANT CHANGE UP (ref 0–0)
PCO2 BLDV: 26 MMHG — LOW (ref 41–51)
PH BLDV: 7.43 — SIGNIFICANT CHANGE UP (ref 7.32–7.43)
PHOSPHATE SERPL-MCNC: 2.4 MG/DL — LOW (ref 2.5–4.5)
PLATELET # BLD AUTO: 360 K/UL — SIGNIFICANT CHANGE UP (ref 150–400)
PO2 BLDV: 59 MMHG — SIGNIFICANT CHANGE UP
POTASSIUM SERPL-MCNC: 3.7 MMOL/L — SIGNIFICANT CHANGE UP (ref 3.5–5.3)
POTASSIUM SERPL-MCNC: 3.8 MMOL/L — SIGNIFICANT CHANGE UP (ref 3.5–5.3)
POTASSIUM SERPL-MCNC: 4.1 MMOL/L — SIGNIFICANT CHANGE UP (ref 3.5–5.3)
POTASSIUM SERPL-SCNC: 3.7 MMOL/L — SIGNIFICANT CHANGE UP (ref 3.5–5.3)
POTASSIUM SERPL-SCNC: 3.8 MMOL/L — SIGNIFICANT CHANGE UP (ref 3.5–5.3)
POTASSIUM SERPL-SCNC: 4.1 MMOL/L — SIGNIFICANT CHANGE UP (ref 3.5–5.3)
PROT SERPL-MCNC: 6.8 G/DL — SIGNIFICANT CHANGE UP (ref 6–8.3)
PROT SERPL-MCNC: 7.7 G/DL — SIGNIFICANT CHANGE UP (ref 6–8.3)
RBC # BLD: 4.28 M/UL — SIGNIFICANT CHANGE UP (ref 3.8–5.2)
RBC # FLD: 12 % — SIGNIFICANT CHANGE UP (ref 10.3–14.5)
SAO2 % BLDV: 92 % — SIGNIFICANT CHANGE UP
SODIUM SERPL-SCNC: 137 MMOL/L — SIGNIFICANT CHANGE UP (ref 135–145)
SODIUM SERPL-SCNC: 138 MMOL/L — SIGNIFICANT CHANGE UP (ref 135–145)
SODIUM SERPL-SCNC: 138 MMOL/L — SIGNIFICANT CHANGE UP (ref 135–145)
WBC # BLD: 13.22 K/UL — HIGH (ref 3.8–10.5)
WBC # FLD AUTO: 13.22 K/UL — HIGH (ref 3.8–10.5)

## 2019-08-14 PROCEDURE — 84132 ASSAY OF SERUM POTASSIUM: CPT

## 2019-08-14 PROCEDURE — 83605 ASSAY OF LACTIC ACID: CPT

## 2019-08-14 PROCEDURE — 86140 C-REACTIVE PROTEIN: CPT

## 2019-08-14 PROCEDURE — 80053 COMPREHEN METABOLIC PANEL: CPT

## 2019-08-14 PROCEDURE — 84100 ASSAY OF PHOSPHORUS: CPT

## 2019-08-14 PROCEDURE — 82803 BLOOD GASES ANY COMBINATION: CPT

## 2019-08-14 PROCEDURE — 87040 BLOOD CULTURE FOR BACTERIA: CPT

## 2019-08-14 PROCEDURE — 84702 CHORIONIC GONADOTROPIN TEST: CPT

## 2019-08-14 PROCEDURE — 83690 ASSAY OF LIPASE: CPT

## 2019-08-14 PROCEDURE — 80048 BASIC METABOLIC PNL TOTAL CA: CPT

## 2019-08-14 PROCEDURE — 81001 URINALYSIS AUTO W/SCOPE: CPT

## 2019-08-14 PROCEDURE — 36415 COLL VENOUS BLD VENIPUNCTURE: CPT

## 2019-08-14 PROCEDURE — 74177 CT ABD & PELVIS W/CONTRAST: CPT

## 2019-08-14 PROCEDURE — 85025 COMPLETE CBC W/AUTO DIFF WBC: CPT

## 2019-08-14 PROCEDURE — 99239 HOSP IP/OBS DSCHRG MGMT >30: CPT | Mod: GC

## 2019-08-14 PROCEDURE — 81025 URINE PREGNANCY TEST: CPT

## 2019-08-14 PROCEDURE — 99285 EMERGENCY DEPT VISIT HI MDM: CPT | Mod: 25

## 2019-08-14 PROCEDURE — 84295 ASSAY OF SERUM SODIUM: CPT

## 2019-08-14 PROCEDURE — 82330 ASSAY OF CALCIUM: CPT

## 2019-08-14 PROCEDURE — 96374 THER/PROPH/DIAG INJ IV PUSH: CPT | Mod: XU

## 2019-08-14 PROCEDURE — 83735 ASSAY OF MAGNESIUM: CPT

## 2019-08-14 RX ORDER — AMPICILLIN SODIUM AND SULBACTAM SODIUM 250; 125 MG/ML; MG/ML
3 INJECTION, POWDER, FOR SUSPENSION INTRAMUSCULAR; INTRAVENOUS EVERY 6 HOURS
Refills: 0 | Status: DISCONTINUED | OUTPATIENT
Start: 2019-08-14 | End: 2019-08-14

## 2019-08-14 RX ORDER — LANOLIN ALCOHOL/MO/W.PET/CERES
5 CREAM (GRAM) TOPICAL ONCE
Refills: 0 | Status: COMPLETED | OUTPATIENT
Start: 2019-08-14 | End: 2019-08-14

## 2019-08-14 RX ORDER — IBUPROFEN 200 MG
600 TABLET ORAL EVERY 6 HOURS
Refills: 0 | Status: DISCONTINUED | OUTPATIENT
Start: 2019-08-14 | End: 2019-08-14

## 2019-08-14 RX ORDER — SODIUM CHLORIDE 9 MG/ML
500 INJECTION, SOLUTION INTRAVENOUS
Refills: 0 | Status: DISCONTINUED | OUTPATIENT
Start: 2019-08-14 | End: 2019-08-14

## 2019-08-14 RX ADMIN — Medication 100 MILLIEQUIVALENT(S): at 04:45

## 2019-08-14 RX ADMIN — LURASIDONE HYDROCHLORIDE 40 MILLIGRAM(S): 40 TABLET ORAL at 01:21

## 2019-08-14 RX ADMIN — QUETIAPINE FUMARATE 100 MILLIGRAM(S): 200 TABLET, FILM COATED ORAL at 01:27

## 2019-08-14 RX ADMIN — SODIUM CHLORIDE 100 MILLILITER(S): 9 INJECTION INTRAMUSCULAR; INTRAVENOUS; SUBCUTANEOUS at 00:31

## 2019-08-14 RX ADMIN — Medication 600 MILLIGRAM(S): at 11:35

## 2019-08-14 RX ADMIN — Medication 20 MILLIGRAM(S): at 01:21

## 2019-08-14 RX ADMIN — PIPERACILLIN AND TAZOBACTAM 200 GRAM(S): 4; .5 INJECTION, POWDER, LYOPHILIZED, FOR SOLUTION INTRAVENOUS at 06:38

## 2019-08-14 RX ADMIN — Medication 600 MILLIGRAM(S): at 11:00

## 2019-08-14 RX ADMIN — Medication 5 MILLIGRAM(S): at 01:21

## 2019-08-14 RX ADMIN — SODIUM CHLORIDE 100 MILLILITER(S): 9 INJECTION INTRAMUSCULAR; INTRAVENOUS; SUBCUTANEOUS at 00:04

## 2019-08-14 RX ADMIN — AMPICILLIN SODIUM AND SULBACTAM SODIUM 200 GRAM(S): 250; 125 INJECTION, POWDER, FOR SUSPENSION INTRAMUSCULAR; INTRAVENOUS at 09:58

## 2019-08-14 RX ADMIN — Medication 50 MILLIGRAM(S): at 01:21

## 2019-08-14 RX ADMIN — AMPICILLIN SODIUM AND SULBACTAM SODIUM 200 GRAM(S): 250; 125 INJECTION, POWDER, FOR SUSPENSION INTRAMUSCULAR; INTRAVENOUS at 15:26

## 2019-08-14 RX ADMIN — Medication 100 MILLIEQUIVALENT(S): at 00:31

## 2019-08-14 RX ADMIN — SODIUM CHLORIDE 50 MILLILITER(S): 9 INJECTION, SOLUTION INTRAVENOUS at 08:54

## 2019-08-14 RX ADMIN — SODIUM CHLORIDE 100 MILLILITER(S): 9 INJECTION INTRAMUSCULAR; INTRAVENOUS; SUBCUTANEOUS at 06:38

## 2019-08-14 NOTE — PROGRESS NOTE ADULT - SUBJECTIVE AND OBJECTIVE BOX
**Incomplete Note**      OVERNIGHT EVENTS:    SUBJECTIVE / INTERVAL HPI: Patient seen and examined at bedside.     VITAL SIGNS:  Vital Signs Last 24 Hrs  T(C): 37.2 (14 Aug 2019 05:08), Max: 37.3 (13 Aug 2019 23:21)  T(F): 99 (14 Aug 2019 05:08), Max: 99.2 (13 Aug 2019 23:21)  HR: 97 (14 Aug 2019 05:08) (95 - 110)  BP: 123/81 (14 Aug 2019 05:08) (111/58 - 135/85)  BP(mean): --  RR: 16 (14 Aug 2019 05:08) (16 - 18)  SpO2: 98% (14 Aug 2019 05:08) (97% - 100%)    PHYSICAL EXAM:    General: WDWN  HEENT: NC/AT; PERRL, anicteric sclera; MMM  Neck: supple  Cardiovascular: +S1/S2; RRR  Respiratory: CTA B/L; no W/R/R  Gastrointestinal: soft, NT/ND; +BSx4  Extremities: WWP; no edema, clubbing or cyanosis  Vascular: 2+ radial, DP/PT pulses B/L  Neurological: AAOx3; no focal deficits    MEDICATIONS:  MEDICATIONS  (STANDING):  FLUoxetine 20 milliGRAM(s) Oral daily  lurasidone 40 milliGRAM(s) Oral daily  piperacillin/tazobactam IVPB.. 3.375 Gram(s) IV Intermittent every 6 hours  QUEtiapine 100 milliGRAM(s) Oral daily  sodium chloride 0.9%. 1000 milliLiter(s) (100 mL/Hr) IV Continuous <Continuous>  topiramate 50 milliGRAM(s) Oral daily    MEDICATIONS  (PRN):      ALLERGIES:  Allergies    Ceclor (Rash)  erythromycin (Rash)  Levaquin (Rash)    Intolerances        LABS:                        13.4   14.38 )-----------( 403      ( 13 Aug 2019 17:59 )             41.6     08-13    138  |  107  |  7   ----------------------------<  88  4.1   |  17<L>  |  0.65    Ca    8.3<L>      13 Aug 2019 23:34    TPro  7.7  /  Alb  3.7  /  TBili  0.2  /  DBili  x   /  AST  22  /  ALT  36  /  AlkPhos  99  08-13      Urinalysis Basic - ( 13 Aug 2019 19:37 )    Color: Yellow / Appearance: Clear / SG: <=1.005 / pH: x  Gluc: x / Ketone: NEGATIVE  / Bili: Negative / Urobili: 0.2 E.U./dL   Blood: x / Protein: NEGATIVE mg/dL / Nitrite: NEGATIVE   Leuk Esterase: Trace / RBC: < 5 /HPF / WBC < 5 /HPF   Sq Epi: x / Non Sq Epi: Moderate /HPF / Bacteria: Present /HPF      CAPILLARY BLOOD GLUCOSE          RADIOLOGY & ADDITIONAL TESTS: Reviewed.    ASSESSMENT:    PLAN:

## 2019-08-14 NOTE — CONSULT NOTE ADULT - SUBJECTIVE AND OBJECTIVE BOX
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    MEDICATIONS  (STANDING):  ampicillin/sulbactam  IVPB 3 Gram(s) IV Intermittent every 6 hours  FLUoxetine 20 milliGRAM(s) Oral daily  lactated ringers. 500 milliLiter(s) (50 mL/Hr) IV Continuous <Continuous>  lurasidone 40 milliGRAM(s) Oral daily  QUEtiapine 100 milliGRAM(s) Oral daily  topiramate 50 milliGRAM(s) Oral daily    MEDICATIONS  (PRN):      Allergies    Ceclor (Rash)  erythromycin (Rash)  Levaquin (Rash)    Intolerances        FAMILY HISTORY:  No pertinent family history in first degree relatives      SOCIAL HISTORY:     Vital Signs Last 24 Hrs  T(C): 37.6 (14 Aug 2019 09:00), Max: 37.6 (14 Aug 2019 09:00)  T(F): 99.7 (14 Aug 2019 09:00), Max: 99.7 (14 Aug 2019 09:00)  HR: 105 (14 Aug 2019 09:00) (95 - 110)  BP: 115/79 (14 Aug 2019 09:00) (111/58 - 135/85)  BP(mean): --  RR: 18 (14 Aug 2019 09:00) (16 - 18)  SpO2: 95% (14 Aug 2019 09:00) (95% - 100%)    PHYSICAL EXAM:  GEN: alert, NAD, comfortable in bed  HEENT: PERRL, no relative afferent pupillary defect, EOMI, moist MM, no pharyngeal erythema or exudate  CV: RRR, S1/S2, no murmurs appreciated, no JVD, no carotid bruits  RESP: CTA bilaterally, good respiratory effort, good air movement, no wheezes/rales  ABD: soft, BS+, nontender, nondistended, no guarding/rebound  EXTREMITIES: WWP, pulses 2+ in all 4 extremities, no peripheral edema  MSK: full range of motion of all 4 extremities  SKIN: warm, dry, intact, no rashes  NEURO: A&Ox3, no focal deficits, strength 5/5 throughout, no sensory deficits  PSYC: normal mood/affect    LABS: reviewed.    CAPILLARY BLOOD GLUCOSE                              12.0   13.22 )-----------( 360      ( 14 Aug 2019 06:04 )             37.8     08-14    137  |  109<H>  |  5<L>  ----------------------------<  89  3.8   |  15<L>  |  0.60    Ca    8.0<L>      14 Aug 2019 06:04  Phos  2.4     08-14  Mg     1.8     08-14    TPro  6.8  /  Alb  3.2<L>  /  TBili  0.2  /  DBili  x   /  AST  19  /  ALT  31  /  AlkPhos  86  08-14      LIVER FUNCTIONS - ( 14 Aug 2019 06:04 )  Alb: 3.2 g/dL / Pro: 6.8 g/dL / ALK PHOS: 86 U/L / ALT: 31 U/L / AST: 19 U/L / GGT: x             Urinalysis Basic - ( 13 Aug 2019 19:37 )    Color: Yellow / Appearance: Clear / SG: <=1.005 / pH: x  Gluc: x / Ketone: NEGATIVE  / Bili: Negative / Urobili: 0.2 E.U./dL   Blood: x / Protein: NEGATIVE mg/dL / Nitrite: NEGATIVE   Leuk Esterase: Trace / RBC: < 5 /HPF / WBC < 5 /HPF   Sq Epi: x / Non Sq Epi: Moderate /HPF / Bacteria: Present /HPF              RADIOLOGY AND ADDITIONAL TESTS: reviewed.    Chest XR:  EKG:  Echocardiogram: GI CONSULT NOTE    CHIEF COMPLAINT: Patient is a 29y old  Female who presents with a chief complaint of ileitis (14 Aug 2019 06:08)    HPI: Patient is a 28 yo F with PMH of depression and cerebral palsy who presented to the ED yesterday for abd pain, subjective fever and body aches x 5 days. Patient states that abd pain started suddenly on Friday AM, 5/10 located in the general lower abdomen. Currently, pain is more localized to LLQ and is intermittent. Patient states she had a fever of 102F at home, along with chills and general body aches that started along with abd pain. Patient admits to constipation with her last BM 2-3 days ago, which was softer than normal but denies any bloody or tarry stools. H/o intermittent diarrhea in the past since childhood, for which patient has had 4 colonoscopies for suspected Crohn disease but was never diagnosed according to patient. Denies any N/V/D, recent travel, changes in diet or questionable foods, known sick contacts or recent antibiotic use.           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    MEDICATIONS  (STANDING):  ampicillin/sulbactam  IVPB 3 Gram(s) IV Intermittent every 6 hours  FLUoxetine 20 milliGRAM(s) Oral daily  lactated ringers. 500 milliLiter(s) (50 mL/Hr) IV Continuous <Continuous>  lurasidone 40 milliGRAM(s) Oral daily  QUEtiapine 100 milliGRAM(s) Oral daily  topiramate 50 milliGRAM(s) Oral daily    MEDICATIONS  (PRN):      Allergies    Ceclor (Rash)  erythromycin (Rash)  Levaquin (Rash)    Intolerances        FAMILY HISTORY:  No pertinent family history in first degree relatives      SOCIAL HISTORY:     Vital Signs Last 24 Hrs  T(C): 37.6 (14 Aug 2019 09:00), Max: 37.6 (14 Aug 2019 09:00)  T(F): 99.7 (14 Aug 2019 09:00), Max: 99.7 (14 Aug 2019 09:00)  HR: 105 (14 Aug 2019 09:00) (95 - 110)  BP: 115/79 (14 Aug 2019 09:00) (111/58 - 135/85)  BP(mean): --  RR: 18 (14 Aug 2019 09:00) (16 - 18)  SpO2: 95% (14 Aug 2019 09:00) (95% - 100%)    PHYSICAL EXAM:  GEN: alert, NAD, comfortable in bed  HEENT: PERRL, no relative afferent pupillary defect, EOMI, moist MM, no pharyngeal erythema or exudate  CV: RRR, S1/S2, no murmurs appreciated, no JVD, no carotid bruits  RESP: CTA bilaterally, good respiratory effort, good air movement, no wheezes/rales  ABD: soft, BS+, nontender, nondistended, no guarding/rebound; mild tenderness to deep palpation in LLQ  EXTREMITIES: WWP, pulses 2+ in all 4 extremities, no peripheral edema  MSK: full range of motion of all 4 extremities  SKIN: warm, dry, intact, no rashes  NEURO: A&Ox3, no focal deficits, strength 5/5 throughout, no sensory deficits  PSYC: normal mood/affect    LABS: reviewed.    CAPILLARY BLOOD GLUCOSE                              12.0   13.22 )-----------( 360      ( 14 Aug 2019 06:04 )             37.8     08-14    137  |  109<H>  |  5<L>  ----------------------------<  89  3.8   |  15<L>  |  0.60    Ca    8.0<L>      14 Aug 2019 06:04  Phos  2.4     08-14  Mg     1.8     08-14    TPro  6.8  /  Alb  3.2<L>  /  TBili  0.2  /  DBili  x   /  AST  19  /  ALT  31  /  AlkPhos  86  08-14      LIVER FUNCTIONS - ( 14 Aug 2019 06:04 )  Alb: 3.2 g/dL / Pro: 6.8 g/dL / ALK PHOS: 86 U/L / ALT: 31 U/L / AST: 19 U/L / GGT: x             Urinalysis Basic - ( 13 Aug 2019 19:37 )    Color: Yellow / Appearance: Clear / SG: <=1.005 / pH: x  Gluc: x / Ketone: NEGATIVE  / Bili: Negative / Urobili: 0.2 E.U./dL   Blood: x / Protein: NEGATIVE mg/dL / Nitrite: NEGATIVE   Leuk Esterase: Trace / RBC: < 5 /HPF / WBC < 5 /HPF   Sq Epi: x / Non Sq Epi: Moderate /HPF / Bacteria: Present /HPF              RADIOLOGY AND ADDITIONAL TESTS: reviewed.  CT abdomen/pelvis w/ PO and IV contrast:    Wall thickening of the terminal ileum as well as a second short segment of small bowel in the mid right abdomen at the level of the umbilicus consistent with terminal ileitis, infectious versus inflammatory. GI CONSULT NOTE    INCOMPLETE NOTE    CHIEF COMPLAINT: Patient is a 29y old  Female who presents with a chief complaint of ileitis (14 Aug 2019 06:08)    HPI: Patient is a 28 yo F with PMH of depression and cerebral palsy who presented to the ED yesterday for abd pain, subjective fever and body aches x 5 days. Patient states that abd pain started suddenly on Friday AM, 5/10 located in the general lower abdomen. Currently, pain is more localized to LLQ and is intermittent. Patient states she had a fever of 102F at home, along with chills and general body aches that started along with abd pain. Patient admits to constipation with her last BM 2-3 days ago, which was softer than normal but denies any bloody or tarry stools. H/o intermittent diarrhea in the past since childhood, for which patient has had 4 colonoscopies for suspected Crohn disease but was never diagnosed according to patient. Denies any N/V/D, recent travel, changes in diet or questionable foods, known sick contacts or recent antibiotic use.           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    MEDICATIONS  (STANDING):  ampicillin/sulbactam  IVPB 3 Gram(s) IV Intermittent every 6 hours  FLUoxetine 20 milliGRAM(s) Oral daily  lactated ringers. 500 milliLiter(s) (50 mL/Hr) IV Continuous <Continuous>  lurasidone 40 milliGRAM(s) Oral daily  QUEtiapine 100 milliGRAM(s) Oral daily  topiramate 50 milliGRAM(s) Oral daily    MEDICATIONS  (PRN):      Allergies    Ceclor (Rash)  erythromycin (Rash)  Levaquin (Rash)    Intolerances        FAMILY HISTORY:  No pertinent family history in first degree relatives      SOCIAL HISTORY:     Vital Signs Last 24 Hrs  T(C): 37.6 (14 Aug 2019 09:00), Max: 37.6 (14 Aug 2019 09:00)  T(F): 99.7 (14 Aug 2019 09:00), Max: 99.7 (14 Aug 2019 09:00)  HR: 105 (14 Aug 2019 09:00) (95 - 110)  BP: 115/79 (14 Aug 2019 09:00) (111/58 - 135/85)  BP(mean): --  RR: 18 (14 Aug 2019 09:00) (16 - 18)  SpO2: 95% (14 Aug 2019 09:00) (95% - 100%)    PHYSICAL EXAM:  GEN: alert, NAD, comfortable in bed  HEENT: PERRL, no relative afferent pupillary defect, EOMI, moist MM, no pharyngeal erythema or exudate  CV: RRR, S1/S2, no murmurs appreciated, no JVD, no carotid bruits  RESP: CTA bilaterally, good respiratory effort, good air movement, no wheezes/rales  ABD: soft, BS+, nontender, nondistended, no guarding/rebound; mild tenderness to deep palpation in LLQ  EXTREMITIES: WWP, pulses 2+ in all 4 extremities, no peripheral edema  MSK: full range of motion of all 4 extremities  SKIN: warm, dry, intact, no rashes  NEURO: A&Ox3, no focal deficits, strength 5/5 throughout, no sensory deficits  PSYC: normal mood/affect    LABS: reviewed.    CAPILLARY BLOOD GLUCOSE                              12.0   13.22 )-----------( 360      ( 14 Aug 2019 06:04 )             37.8     08-14    137  |  109<H>  |  5<L>  ----------------------------<  89  3.8   |  15<L>  |  0.60    Ca    8.0<L>      14 Aug 2019 06:04  Phos  2.4     08-14  Mg     1.8     08-14    TPro  6.8  /  Alb  3.2<L>  /  TBili  0.2  /  DBili  x   /  AST  19  /  ALT  31  /  AlkPhos  86  08-14      LIVER FUNCTIONS - ( 14 Aug 2019 06:04 )  Alb: 3.2 g/dL / Pro: 6.8 g/dL / ALK PHOS: 86 U/L / ALT: 31 U/L / AST: 19 U/L / GGT: x             Urinalysis Basic - ( 13 Aug 2019 19:37 )    Color: Yellow / Appearance: Clear / SG: <=1.005 / pH: x  Gluc: x / Ketone: NEGATIVE  / Bili: Negative / Urobili: 0.2 E.U./dL   Blood: x / Protein: NEGATIVE mg/dL / Nitrite: NEGATIVE   Leuk Esterase: Trace / RBC: < 5 /HPF / WBC < 5 /HPF   Sq Epi: x / Non Sq Epi: Moderate /HPF / Bacteria: Present /HPF              RADIOLOGY AND ADDITIONAL TESTS: reviewed.  CT abdomen/pelvis w/ PO and IV contrast:    Wall thickening of the terminal ileum as well as a second short segment of small bowel in the mid right abdomen at the level of the umbilicus consistent with terminal ileitis, infectious versus inflammatory.

## 2019-08-14 NOTE — DISCHARGE NOTE PROVIDER - NSDCCPTREATMENT_GEN_ALL_CORE_FT
PRINCIPAL PROCEDURE  Procedure: CT abdomen with contrast  Findings and Treatment: IMPRESSION:   Wall thickening of the terminal ileum as well as a second short segment   of small bowel in the mid right abdomen at the level of the umbilicus   consistent with terminal ileitis, infectious versus inflammatory. Though   not specific involvement of the terminal ileum can be seen with Crohn's   colitis.

## 2019-08-14 NOTE — DISCHARGE NOTE PROVIDER - NSDCFUADDAPPT_GEN_ALL_CORE_FT
Patient will follow up with private outpatient PCP an gastroenterologist. Please schedule an appointment within 1-2 weeks. Patient will follow up with private outpatient PCP and gastroenterologist. Please schedule an appointment within 1-2 weeks.

## 2019-08-14 NOTE — PROGRESS NOTE ADULT - PROBLEM SELECTOR PLAN 1
CT abdomen/pelvis with PO and IV contrast: Wall thickening of the terminal ileum as well as a second short segment of small bowel in the mid right abdomen at the level of the umbilicus consistent with terminal ileitis, infectious versus inflammatory.   Patient with no formal diagnosis of Crohn's disease and reports having 4 colonoscopies in the past. However, no recent GI follow-up. Given fever, and leukocytosis would suggest more of infectious etiology, but also per history patient has low risk for infectious colitis, and ilelitis also suggests common location for Crohn's disease.  Low concern for C diff as patient with no recent diarrhea and no antibiotic exposure  Lactate WNL at 0.8, but obtained after 1L of NS. Low clinical concern for bowel ischemia.   -c/w Zosyn for GI organism coverage (patient with  allergy levofloxacin, sulfa, erythromycin and Ceclor all causing hives)  -IV fluid hydration and correction of electrolyte   -c/w IV fluid hydration  -GI PCR (collet on next BM)  -GI following appreciate recs

## 2019-08-14 NOTE — DISCHARGE NOTE PROVIDER - NSDCCPCAREPLAN_GEN_ALL_CORE_FT
PRINCIPAL DISCHARGE DIAGNOSIS  Diagnosis: Ileitis  Assessment and Plan of Treatment: Ileitis      SECONDARY DISCHARGE DIAGNOSES  Diagnosis: Metabolic acidosis  Assessment and Plan of Treatment: Metabolic acidosis PRINCIPAL DISCHARGE DIAGNOSIS  Diagnosis: Ileitis  Assessment and Plan of Treatment: You were admitted for illietis, or inflammation of part of the small intestine. This can be inflammatory or infectious in nature. You were strated on antibiotics given your fevers and increased white blood cell count, that may indicate an infection. You will be on augmentin (antibiotic) for 6 more days to help with this infection. Please follow up with your primary care doctor within 1-2 weeks of discharge and your gastroenterologist for the appropriate workup. We recommend obtaining CT enterography/MR enterography  imaging to determine etiology and if colonoscopy is neccessary.      SECONDARY DISCHARGE DIAGNOSES  Diagnosis: Metabolic acidosis  Assessment and Plan of Treatment: You had a metabolic acidosis on admission. You received fluid rehydration to help. Please follow up with your primary car doctor within one to two weeks of discharge to follow up appropriate labs (CBC, BMP). PRINCIPAL DISCHARGE DIAGNOSIS  Diagnosis: Ileitis  Assessment and Plan of Treatment: You were admitted for illietis, or inflammation of part of the small intestine. This can be inflammatory or infectious in nature. You were strated on antibiotics given your fevers and increased white blood cell count, that may indicate an infection. You will be on augmentin (antibiotic) for 6 more days to help with this infection. Please follow up with your primary care doctor within 1-2 weeks of discharge and your gastroenterologist for the appropriate workup. We recommend obtaining CT enterography/MR enterography  imaging to determine etiology and if colonoscopy is neccessary.      SECONDARY DISCHARGE DIAGNOSES  Diagnosis: Metabolic acidosis  Assessment and Plan of Treatment: You had a metabolic acidosis on admission. You received fluid rehydration to help. Please follow up with your primary care doctor within one to two weeks of discharge to follow up appropriate labs (CBC, BMP).

## 2019-08-14 NOTE — CONSULT NOTE ADULT - ASSESSMENT
28 yo F with PMH of depression and cerebral palsy, presents with 5 days of abd pain, subjective fever, body aches and constipation. Patient has h/o intermittent episodes of diarrhea in the past, along with 4 colonoscopies for suspected Crohn disease but no clear diagnosis according to patient. Admitted for management of ileitis, infectious versus inflammatory etiology.     Plan  #abd pain  - CT abd shows concern for terminal ileitis, infectious vs inflammatory  - Patient has WBC of 13.2, subjective fever and body aches, suggesting possible infectious etiology  - Terminal ileitis and h/o diarrhea and GI problems increases suspicion for possible Crohn's disease  - C/w Unasyn for empiric coverage  - Stool PCR, C. diff, CRP, fecal calpro  - Consider colonoscopy after stool studies reviewed 30 yo F with PMH of depression and cerebral palsy, presents with 5 days of abd pain, subjective fever, body aches and constipation. Patient has h/o intermittent episodes of diarrhea in the past, along with 4 colonoscopies for suspected Crohn disease but no clear diagnosis according to patient. Admitted for management of ileitis, infectious versus inflammatory etiology.     Plan  #abd pain  Patient presenting with abdominal pain, loose stool a few days ago and now constipation. CT abd shows concern for terminal ileitis, infectious vs inflammatory. Patient has WBC of 13.2, subjective fever and body aches, suggesting possible infectious etiology. Terminal ileitis and h/o diarrhea and GI problems increases suspicion for possible Crohn's disease  - C/w Unasyn for empiric coverage  - Stool PCR, CRP, fecal calpro  - Will obtain collateral from PCP and GI attending  - Will discuss with attending for further recommendations 30 yo F with PMH of depression and cerebral palsy, presents with 5 days of abd pain, subjective fever, body aches and constipation. Patient has h/o intermittent episodes of diarrhea in the past, along with 4 colonoscopies for suspected Crohn disease but no clear diagnosis according to patient. Admitted for management of ileitis, infectious versus inflammatory etiology.     Plan  #abd pain  Patient presenting with abdominal pain, loose stool a few days ago and now constipation. CT abd shows concern for terminal ileitis, with involvement of another proximal seg of SI,  infectious vs inflammatory. Patient has WBC of 13.2, subjective fever and body aches, suggesting possible infectious etiology. Terminal ileitis and h/o diarrhea, suspicion for possible Crohn's disease versus infectious etiology  - clinically improving, wbc trending down, no fever, VSS, pain better  - Stool PCR, CRP, fecal calpro  - please obtain collateral from PCP and GI attending regarding prior GI workup/ colonoscopy and biopsies  - will need outpatient imaging CT enterography/MR enterography and based on that will decide further need for colonoscopy with retrograde enteroscopy with biopsies.  -can follow up outpatient with her own GI or Dr Springer      discussed with attending Dr Springer 30 yo F with PMH of depression and cerebral palsy, presents with 5 days of abd pain, subjective fever, body aches and constipation. Patient has h/o intermittent episodes of diarrhea in the past, along with 4 colonoscopies for suspected Crohn disease but no clear diagnosis according to patient. Admitted for management of ileitis, infectious versus inflammatory etiology.     Plan  #abd pain  Patient presenting with abdominal pain, loose stool a few days ago and now constipation. CT abd shows concern for terminal ileitis, with involvement of another proximal seg of SI,  infectious vs inflammatory. Patient has WBC of 13.2, subjective fever and body aches, suggesting possible infectious etiology. Terminal ileitis and h/o diarrhea, suspicion for possible Crohn's disease versus infectious etiology  - clinically improving, wbc trending down, no fever, VSS, pain better  - Stool PCR, CRP, fecal calpro  - please obtain collateral from PCP and GI attending regarding prior GI workup/ colonoscopy and biopsies  - will need outpatient imaging CT enterography/MR enterography and based on that will decide further need for colonoscopy with retrograde enteroscopy with biopsies.  -can follow up outpatient with her own GI or Dr Springer    Note edited as needed    Melanie Young  Fellow GI  discussed with attending Dr Springer as above

## 2019-08-14 NOTE — DISCHARGE NOTE NURSING/CASE MANAGEMENT/SOCIAL WORK - NSDCDPATPORTLINK_GEN_ALL_CORE
You can access the [facility] Portal, offered by [facility], by registering with the following website: [email or web address]

## 2019-08-14 NOTE — DISCHARGE NOTE PROVIDER - PROVIDER TOKENS
FREE:[LAST:[Eduardo],FIRST:[Berta],PHONE:[(   )    -],FAX:[(   )    -],ADDRESS:[[address] [place] [id number]  Please follow up between 1-2 weeks]],FREE:[LAST:[Javier],FIRST:[Kale],PHONE:[(   )    -],FAX:[(   )    -],ADDRESS:[[address] [place] [id number] [phone]  Please follow up within 1-2 weeks.]]

## 2019-08-14 NOTE — DISCHARGE NOTE PROVIDER - CARE PROVIDER_API CALL
Berta Clark  [address] [place] [address]  Please follow up between 1-2 weeks  Phone: (   )    -  Fax: (   )    -  Follow Up Time:     Kale Herrera  [address]  [place] [address] [phone]  Please follow up within 1-2 weeks.  Phone: (   )    -  Fax: (   )    -  Follow Up Time:

## 2019-08-14 NOTE — ED ADULT NURSE REASSESSMENT NOTE - NS ED NURSE REASSESS COMMENT FT1
Patient a/oX 3, c/o of abdominal pain w/ diarrhea, none since arrival to ED, no nausea/vomitting.  Vital signs stable.  Additional labs, blood cultures sent to lab. Valium 5mg PO adminsitered for c/o of anxiety w/ good effects.    IV ABT Zosyn IV ABT completed;  KCL 10meq 1st dose completed;  KCL 40meq PO adminsitered.  For admit 4 Uris.  Endorsement report and care received by Anirudh.  Transport pending. Patient stable and comfortable.  NSS ongoing 100ml/hr.

## 2019-08-14 NOTE — PROGRESS NOTE ADULT - PROBLEM SELECTOR PLAN 6
Fluids: IV NS hydration  Electrolytes: monitor and correct as needed  Nutrition: Regular diet     Code: Full code  DVT prophylaxis: low risk, no pharmacologic ppx needed (Padua score = 2)  GI prophylaxis: not needed  Dispo: admit to Medicine

## 2019-08-14 NOTE — DISCHARGE NOTE NURSING/CASE MANAGEMENT/SOCIAL WORK - NSDCFUADDAPPT_GEN_ALL_CORE_FT
Patient will follow up with private outpatient PCP and gastroenterologist. Please schedule an appointment within 1-2 weeks.

## 2019-08-14 NOTE — DISCHARGE NOTE PROVIDER - HOSPITAL COURSE
28 y/o female with pertinent medical history of cerebral palsy, depression, anxiety, and intermittent episodes of diarrhea and constipation, previously negative colonoscopies x4, presenting with 5 days of abdominal pain, subjective fevers, body aches and constipation. Admitted for management of ileitis, infectious versus inflammatory etiology.         Problem List/Main Diagnoses (system-based):     Abdominal pain: 5 day history of abdominal pain. Leukocytosis of 14.38 on presentation.  CT abdomen/pelvis with PO and IV contrast indicative of wall thickening of the terminal ileum as well as a second short segment of small bowel in the mid right abdomen at the level of the umbilicus consistent with terminal ileitis, infectious versus inflammatory. Started on zosyn for GI organism coverage, switched to Unasyn. Will be discharged on ______ for ___ days.         Sepsis: Leukocytosis and tachycardic on presentation, with known source of ileitis per CT abdomen.         Metabolic acidosis:                    Patient presenting with abdominal pain, loose stool a few days ago and now constipation. CT abd shows concern for terminal ileitis, with involvement of another proximal seg of SI,  infectious vs inflammatory. Patient has WBC of 13.2, subjective fever and body aches, suggesting possible infectious etiology. Terminal ileitis and h/o diarrhea, suspicion for possible Crohn's disease versus infectious etiology    - clinically improving, wbc trending down, no fever, VSS, pain better    - Stool PCR, CRP, fecal calpro    - please obtain collateral from PCP and GI attending regarding prior GI workup/ colonoscopy and biopsies    - will need outpatient imaging CT enterography/MR enterography and based on that will decide further need for colonoscopy with retrograde enteroscopy with biopsies.    -can follow up outpatient with her own GI or Dr             Inpatient treatment course:     New medications:     Labs to be followed outpatient:     Exam to be followed outpatient: 28 y/o female with pertinent medical history of cerebral palsy, depression, anxiety, and intermittent episodes of diarrhea and constipation, previously negative colonoscopies x4, presenting with 5 days of abdominal pain, subjective fevers, body aches and constipation. Admitted for management of ileitis, infectious versus inflammatory etiology.         Problem List/Main Diagnoses (system-based):     Ileitis: 5 day history of abdominal pain. Leukocytosis of 14.38 on presentation.  CT abdomen/pelvis with PO and IV contrast indicative of wall thickening of the terminal ileum as well as a second short segment of small bowel in the mid right abdomen at the level of the umbilicus consistent with terminal ileitis, infectious versus inflammatory. ESR and CRP sent. Started on zosyn for GI organism coverage, switched to Unasyn. Previous gastroenterologist contacted for previous medical records. Will be discharged on Augmentin for 7 days.  Outpatient imaging CT enterography/MR enterography and based on that will decide further need for colonoscopy with retrograde enteroscopy with biopsies.         Sepsis: Leukocytosis and tachycardic on presentation, with known source of ileitis per CT abdomen. UA negative. Blood cultures negative. Stool PCR, CRP and fetal calpro collected. Started on zosyn for GI organism coverage, switched to Unasyn. Discharged on 7 day course of Augmentin 875. Patient WBC trending down.         Metabolic acidosis: Patient with VBG on presentation demonstrating pH 7.27, pCO2 24; HCO3 16. Suggesting non-anion gap metabolic acidosis with respiratory compensation. Received 1L of NS and LR mainatence fluid. Follow up outpatient BMP.            New medications: Augmentin 875        Labs to be followed outpatient: CBC, BMP         Exam to be followed outpatient: T enterography/MR enterography 28 y/o female with pertinent medical history of cerebral palsy, depression, anxiety, and intermittent episodes of diarrhea and constipation, previously negative colonoscopies x4, presenting with 5 days of abdominal pain, subjective fevers, body aches and constipation. Admitted for management of ileitis, infectious versus inflammatory etiology.         Problem List/Main Diagnoses (system-based):     Ileitis: 5 day history of abdominal pain. Leukocytosis of 14.38 on presentation.  CT abdomen/pelvis with PO and IV contrast indicative of wall thickening of the terminal ileum as well as a second short segment of small bowel in the mid right abdomen at the level of the umbilicus consistent with terminal ileitis, infectious versus inflammatory. ESR and CRP sent. Started on zosyn for GI organism coverage, switched to Unasyn. Previous gastroenterologist contacted for previous medical records. Will be discharged on Augmentin for 7 days.  Outpatient imaging CT enterography/MR enterography and based on that will decide further need for colonoscopy with retrograde enteroscopy with biopsies.         Sepsis: Leukocytosis and tachycardic on presentation, with known source of ileitis per CT abdomen. UA negative. Blood cultures negative. Stool PCR, CRP and fetal calpro collected. Started on zosyn for GI organism coverage, switched to Unasyn. Discharged on 7 day course of Augmentin 875. Patient WBC trending down.         Metabolic acidosis: Patient with VBG on presentation demonstrating pH 7.27, pCO2 24; HCO3 16. Suggesting non-anion gap metabolic acidosis with respiratory compensation. Received 1L of NS and LR mainatence fluid. Follow up outpatient BMP.            New medications: Augmentin 875 BID        Labs to be followed outpatient: CBC, BMP         Exam to be followed outpatient: CT enterography/MR enterography

## 2019-08-14 NOTE — PROGRESS NOTE ADULT - PROBLEM SELECTOR PLAN 2
Patient meeting 2/4 SIRS criteria in ED. HR > 90 and 14.38. Also with reported fevers up to to 102.3 F at home.   Source may be 2/2 infectious ileitis, but patient with no clear risk factors for infectious colitis. UA negative for UTI.  Lactate WNL at 0.8, but obtained after 1L of NS   s/p 2L NS bolus in ED.  -fu BCx  -Management as above

## 2019-08-14 NOTE — PROGRESS NOTE ADULT - PROBLEM SELECTOR PLAN 4
Patient with VBG showing pH 7.27, pCO2 24; HCO3 16. Suggesting non-anion gap metabolic acidosis with respiratory compensation. This may be 2/2 diarrhea, but patient reports no recent diarrhea.   -continue to monitor w/ labs.   -IV fluid hydration

## 2019-08-14 NOTE — PROGRESS NOTE ADULT - ATTENDING COMMENTS
Patient was seen and examined with the resident team today.  I agree with Dr. Tran's assessment and plan with the following exceptions/additions:     Briefly, this is a 30yo woman with a PMH of CP, intermittent periods of diarrhea and constipation w/multiple negative c-scopes and MDD/MADAI who p/w initially diffuse abdominal pain associated with diarrhea but now with 2-3 days of constipation, found to have a non-AG metabolic acidosis and ileitis on CTAP.  Given her CP history, would question whether she has an underlying motility disorder +/- IBS that predisposed her to ileitis.  At present, she endorses LLQ abdominal but does state her pain at one point was R-sided and band-like.  No history of ulcerations, pain with vision, mucus in stool or hematochezia.  She was tolerating some PO intake on the day leading up to her ED presentation but minimally.  Unable to obtain a family history as pt is adopted.  Confirms prior c-scopes have been negative.  Last expose to antibiotics "several months ago."    -- discuss differential with GI  -- would defer GI PCR as pt not having diarrhea at present and already on empiric abx  -- would defer C. diff as pt does not meet criteria for C. diff colitis and has not have a bowel movement for days  -- agree w/narrowing antibiotics  -- would repeat BMP to assess HCO3  -- c/w hydration via LR given hyperchloremia   -- if concerned for IBD, would check ESR/CRP  -- DVT PPx - ambulatory  -- Dispo - TBD    Vera Peterson  [phone]

## 2019-08-19 DIAGNOSIS — K50.00 CROHN'S DISEASE OF SMALL INTESTINE WITHOUT COMPLICATIONS: ICD-10-CM

## 2019-08-19 DIAGNOSIS — A41.9 SEPSIS, UNSPECIFIED ORGANISM: ICD-10-CM

## 2019-08-19 DIAGNOSIS — F32.9 MAJOR DEPRESSIVE DISORDER, SINGLE EPISODE, UNSPECIFIED: ICD-10-CM

## 2019-08-19 DIAGNOSIS — Z88.1 ALLERGY STATUS TO OTHER ANTIBIOTIC AGENTS STATUS: ICD-10-CM

## 2019-08-19 DIAGNOSIS — G80.9 CEREBRAL PALSY, UNSPECIFIED: ICD-10-CM

## 2019-08-19 DIAGNOSIS — E87.2 ACIDOSIS: ICD-10-CM

## 2019-08-19 LAB
CULTURE RESULTS: SIGNIFICANT CHANGE UP
CULTURE RESULTS: SIGNIFICANT CHANGE UP
SPECIMEN SOURCE: SIGNIFICANT CHANGE UP
SPECIMEN SOURCE: SIGNIFICANT CHANGE UP

## 2020-12-16 NOTE — PATIENT PROFILE ADULT - FUNCTIONAL SCREEN CURRENT LEVEL: DRESSING, MLM
CARDIOLOGY FOLLOW UP - Dr. Chavez    CC c/o abd distention   no cp or sob        PHYSICAL EXAM:  T(C): 36.3 (12-16-20 @ 10:44), Max: 36.9 (12-16-20 @ 06:13)  HR: 68 (12-16-20 @ 10:44) (68 - 83)  BP: 115/87 (12-16-20 @ 10:44) (110/67 - 142/85)  RR: 18 (12-16-20 @ 10:44) (17 - 18)  SpO2: 100% (12-16-20 @ 10:44) (100% - 100%)  Wt(kg): --  I&O's Summary      Appearance: Normal	  Cardiovascular: Normal S1 S2,RRR, No JVD, No murmurs  Respiratory: Lungs clear to auscultation	  Gastrointestinal:  distended , Non-tender, + BS	  Extremities: Normal range of motion, No clubbing, cyanosis or edema      Home Medications:      MEDICATIONS  (STANDING):  dicyclomine 10 milliGRAM(s) Oral two times a day before meals  influenza   Vaccine 0.5 milliLiter(s) IntraMuscular once  magnesium citrate Oral Solution 1 Bottle Oral once  piperacillin/tazobactam IVPB.. 3.375 Gram(s) IV Intermittent every 8 hours  polyethylene glycol 3350 17 Gram(s) Oral daily  senna 1 Tablet(s) Oral daily      TELEMETRY: off tele  	    ECG:  	  RADIOLOGY:   DIAGNOSTIC TESTING:  [ ] Echocardiogram:  [ ]  Catheterization:  [ ] Stress Test:    OTHER: 	    LABS:	 	                            11.3   6.49  )-----------( 253      ( 16 Dec 2020 06:58 )             35.9     12-16    135  |  104  |  12  ----------------------------<  143<H>  4.0   |  21<L>  |  0.76    Ca    9.2      16 Dec 2020 06:58               0 = independent

## 2021-04-16 NOTE — PATIENT PROFILE ADULT - DO YOU FEEL UNSAFE AT HOME, WORK, OR SCHOOL?
no rashes , no suspicious lesions , no areas of discoloration , no jaundice present , good turgor , no masses , no tenderness on palpation
no

## 2021-05-21 ENCOUNTER — EMERGENCY (EMERGENCY)
Facility: HOSPITAL | Age: 31
LOS: 1 days | Discharge: HOME | End: 2021-05-21
Attending: EMERGENCY MEDICINE | Admitting: EMERGENCY MEDICINE
Payer: COMMERCIAL

## 2021-05-21 VITALS
WEIGHT: 179.9 LBS | DIASTOLIC BLOOD PRESSURE: 78 MMHG | TEMPERATURE: 99 F | HEIGHT: 59 IN | SYSTOLIC BLOOD PRESSURE: 138 MMHG | HEART RATE: 116 BPM | OXYGEN SATURATION: 98 % | RESPIRATION RATE: 18 BRPM

## 2021-05-21 VITALS
HEART RATE: 101 BPM | SYSTOLIC BLOOD PRESSURE: 132 MMHG | RESPIRATION RATE: 18 BRPM | OXYGEN SATURATION: 98 % | TEMPERATURE: 99 F | DIASTOLIC BLOOD PRESSURE: 79 MMHG

## 2021-05-21 DIAGNOSIS — Z87.440 PERSONAL HISTORY OF URINARY (TRACT) INFECTIONS: ICD-10-CM

## 2021-05-21 DIAGNOSIS — R00.0 TACHYCARDIA, UNSPECIFIED: ICD-10-CM

## 2021-05-21 DIAGNOSIS — Z88.8 ALLERGY STATUS TO OTHER DRUGS, MEDICAMENTS AND BIOLOGICAL SUBSTANCES: ICD-10-CM

## 2021-05-21 DIAGNOSIS — Z88.2 ALLERGY STATUS TO SULFONAMIDES: ICD-10-CM

## 2021-05-21 DIAGNOSIS — Z88.0 ALLERGY STATUS TO PENICILLIN: ICD-10-CM

## 2021-05-21 DIAGNOSIS — Z91.018 ALLERGY TO OTHER FOODS: ICD-10-CM

## 2021-05-21 DIAGNOSIS — R10.31 RIGHT LOWER QUADRANT PAIN: ICD-10-CM

## 2021-05-21 DIAGNOSIS — Z98.890 OTHER SPECIFIED POSTPROCEDURAL STATES: Chronic | ICD-10-CM

## 2021-05-21 DIAGNOSIS — N30.00 ACUTE CYSTITIS WITHOUT HEMATURIA: ICD-10-CM

## 2021-05-21 DIAGNOSIS — Z88.1 ALLERGY STATUS TO OTHER ANTIBIOTIC AGENTS STATUS: ICD-10-CM

## 2021-05-21 LAB
ALBUMIN SERPL ELPH-MCNC: 4.1 G/DL — SIGNIFICANT CHANGE UP (ref 3.3–5)
ALP SERPL-CCNC: 97 U/L — SIGNIFICANT CHANGE UP (ref 40–120)
ALT FLD-CCNC: 26 U/L — SIGNIFICANT CHANGE UP (ref 10–45)
ANION GAP SERPL CALC-SCNC: 14 MMOL/L — SIGNIFICANT CHANGE UP (ref 5–17)
APPEARANCE UR: CLEAR — SIGNIFICANT CHANGE UP
AST SERPL-CCNC: 28 U/L — SIGNIFICANT CHANGE UP (ref 10–40)
BACTERIA # UR AUTO: PRESENT /HPF
BILIRUB SERPL-MCNC: 0.3 MG/DL — SIGNIFICANT CHANGE UP (ref 0.2–1.2)
BILIRUB UR-MCNC: NEGATIVE — SIGNIFICANT CHANGE UP
BUN SERPL-MCNC: 9 MG/DL — SIGNIFICANT CHANGE UP (ref 7–23)
CALCIUM SERPL-MCNC: 9.1 MG/DL — SIGNIFICANT CHANGE UP (ref 8.4–10.5)
CHLORIDE SERPL-SCNC: 103 MMOL/L — SIGNIFICANT CHANGE UP (ref 96–108)
CO2 SERPL-SCNC: 18 MMOL/L — LOW (ref 22–31)
COLOR SPEC: YELLOW — SIGNIFICANT CHANGE UP
CREAT SERPL-MCNC: 0.67 MG/DL — SIGNIFICANT CHANGE UP (ref 0.5–1.3)
DIFF PNL FLD: ABNORMAL
EPI CELLS # UR: ABNORMAL /HPF (ref 0–5)
GLUCOSE SERPL-MCNC: 101 MG/DL — HIGH (ref 70–99)
GLUCOSE UR QL: NEGATIVE — SIGNIFICANT CHANGE UP
KETONES UR-MCNC: NEGATIVE — SIGNIFICANT CHANGE UP
LEUKOCYTE ESTERASE UR-ACNC: NEGATIVE — SIGNIFICANT CHANGE UP
LIDOCAIN IGE QN: 23 U/L — SIGNIFICANT CHANGE UP (ref 7–60)
NITRITE UR-MCNC: NEGATIVE — SIGNIFICANT CHANGE UP
PH UR: 6 — SIGNIFICANT CHANGE UP (ref 5–8)
POTASSIUM SERPL-MCNC: SIGNIFICANT CHANGE UP (ref 3.5–5.3)
POTASSIUM SERPL-SCNC: SIGNIFICANT CHANGE UP (ref 3.5–5.3)
PROT SERPL-MCNC: 8 G/DL — SIGNIFICANT CHANGE UP (ref 6–8.3)
PROT UR-MCNC: NEGATIVE MG/DL — SIGNIFICANT CHANGE UP
RBC CASTS # UR COMP ASSIST: < 5 /HPF — SIGNIFICANT CHANGE UP
SODIUM SERPL-SCNC: 135 MMOL/L — SIGNIFICANT CHANGE UP (ref 135–145)
SP GR SPEC: 1.01 — SIGNIFICANT CHANGE UP (ref 1–1.03)
UROBILINOGEN FLD QL: 0.2 E.U./DL — SIGNIFICANT CHANGE UP
WBC UR QL: ABNORMAL /HPF

## 2021-05-21 PROCEDURE — 93005 ELECTROCARDIOGRAM TRACING: CPT

## 2021-05-21 PROCEDURE — G1004: CPT

## 2021-05-21 PROCEDURE — 74176 CT ABD & PELVIS W/O CONTRAST: CPT | Mod: 26,MG

## 2021-05-21 PROCEDURE — 81001 URINALYSIS AUTO W/SCOPE: CPT

## 2021-05-21 PROCEDURE — 36415 COLL VENOUS BLD VENIPUNCTURE: CPT

## 2021-05-21 PROCEDURE — 87086 URINE CULTURE/COLONY COUNT: CPT

## 2021-05-21 PROCEDURE — 83690 ASSAY OF LIPASE: CPT

## 2021-05-21 PROCEDURE — 80053 COMPREHEN METABOLIC PANEL: CPT

## 2021-05-21 PROCEDURE — 74176 CT ABD & PELVIS W/O CONTRAST: CPT

## 2021-05-21 PROCEDURE — 99285 EMERGENCY DEPT VISIT HI MDM: CPT

## 2021-05-21 PROCEDURE — 99284 EMERGENCY DEPT VISIT MOD MDM: CPT | Mod: 25

## 2021-05-21 RX ORDER — ACETAMINOPHEN 500 MG
650 TABLET ORAL ONCE
Refills: 0 | Status: COMPLETED | OUTPATIENT
Start: 2021-05-21 | End: 2021-05-21

## 2021-05-21 RX ORDER — IOHEXOL 300 MG/ML
30 INJECTION, SOLUTION INTRAVENOUS ONCE
Refills: 0 | Status: COMPLETED | OUTPATIENT
Start: 2021-05-21 | End: 2021-05-21

## 2021-05-21 RX ORDER — NITROFURANTOIN MACROCRYSTAL 50 MG
1 CAPSULE ORAL
Qty: 14 | Refills: 0
Start: 2021-05-21 | End: 2021-05-27

## 2021-05-21 RX ADMIN — Medication 650 MILLIGRAM(S): at 17:10

## 2021-05-21 RX ADMIN — IOHEXOL 30 MILLILITER(S): 300 INJECTION, SOLUTION INTRAVENOUS at 14:26

## 2021-05-21 RX ADMIN — Medication 650 MILLIGRAM(S): at 14:27

## 2021-05-21 NOTE — ED PROVIDER NOTE - CARE PROVIDER_API CALL
[facility]  UROLOGY  [address] [place] [id number]  Phone: [phone]  Fax: [phone]  Follow Up Time: 4-6 Days

## 2021-05-21 NOTE — ED ADULT NURSE NOTE - OBJECTIVE STATEMENT
Pt BIBS p/w bilateral abdominal pain. States hx of ileitis "years ago" presenting today with abdominal pain and fever yesterday. States worsening today. Endorsing "trickling of urine", with a recent finding of elevated WBC in urine as per patient. No respiratory distress noted. EKG done on arrival.

## 2021-05-21 NOTE — ED PROVIDER NOTE - CLINICAL SUMMARY MEDICAL DECISION MAKING FREE TEXT BOX
30 yo female with 2 days of fever, lower abd pain  odor to urine x weeks  will check urine, labs, ct abd ro appy.  pain is now greater on left, but at urgent care had more rlq pain 32 yo female with 2 days of fever, lower abd pain  odor to urine x weeks  will check urine, labs, ct abd ro appy.  pain is now greater on left, but at urgent care had more rlq pain  ct shows normal appendix  epiploic appendigitis  urine with wbc, culture sent, will start on abx given urinary symptoms  fu pmd or urologist

## 2021-05-21 NOTE — ED PROVIDER NOTE - OBJECTIVE STATEMENT
felt feverish yesterday, headache  went to urgent care and when they examined her abd had some rlq pain.  was told to come to hospital  pain is now bilateral lower abd  odor to urine x weeks.  was treated 6 months ago for uti  covid test yesterday negative, has received vaccine x 2 as of 3 weeks ago.  no known sick contacts

## 2021-05-21 NOTE — ED PROVIDER NOTE - NSFOLLOWUPINSTRUCTIONS_ED_ALL_ED_FT
Urinary Tract Infection in Women    WHAT YOU NEED TO KNOW:    A urinary tract infection (UTI) is caused by bacteria that get inside your urinary tract. Most bacteria that enter your urinary tract come out when you urinate. If the bacteria stay in your urinary tract, you may get an infection. Your urinary tract includes your kidneys, ureters, bladder, and urethra. Urine is made in your kidneys, and it flows from the ureters to the bladder. Urine leaves the bladder through the urethra. A UTI is more common in your lower urinary tract, which includes your bladder and urethra.     Female Urinary System         DISCHARGE INSTRUCTIONS:    Return to the emergency department if:   •You are urinating very little or not at all.      •You have a high fever with shaking chills.       •You have side or back pain that gets worse.      Call your doctor if:   •You have a fever.      •You do not feel better after 2 days of taking antibiotics.      •You are vomiting.       •You have questions or concerns about your condition or care.      Medicines:   •Antibiotics help fight a bacterial infection. If you have UTIs often (called recurrent UTIs), you may be given antibiotics to take regularly. You will be given directions for when and how to use antibiotics. The goal is to prevent UTIs but not cause antibiotic resistance by using antibiotics too often.      •Medicines may be given to decrease pain and burning when you urinate. They will also help decrease the feeling that you need to urinate often. These medicines will make your urine orange or red.      •Take your medicine as directed. Contact your healthcare provider if you think your medicine is not helping or if you have side effects. Tell him or her if you are allergic to any medicine. Keep a list of the medicines, vitamins, and herbs you take. Include the amounts, and when and why you take them. Bring the list or the pill bottles to follow-up visits. Carry your medicine list with you in case of an emergency.      Follow up with your healthcare provider as directed: Write down your questions so you remember to ask them during your visits.     Prevent another UTI:   •Empty your bladder often. Urinate and empty your bladder as soon as you feel the need. Do not hold your urine for long periods of time.      •Wipe from front to back after you urinate or have a bowel movement. This will help prevent germs from getting into your urinary tract through your urethra.      •Drink liquids as directed. Ask how much liquid to drink each day and which liquids are best for you. You may need to drink more liquids than usual to help flush out the bacteria. Do not drink alcohol, caffeine, or citrus juices. These can irritate your bladder and increase your symptoms. Your healthcare provider may recommend cranberry juice to help prevent a UTI.      •Urinate after you have sex. This can help flush out bacteria passed during sex.      •Do not douche or use feminine deodorants. These can change the chemical balance in your vagina.      •Change sanitary pads or tampons often. This will help prevent germs from getting into your urinary tract.       •Talk to your healthcare provider about your birth control method. You may need to change your method if it is increasing your risk for UTIs.      •Wear cotton underwear and clothes that are loose. Tight pants and nylon underwear can trap moisture and cause bacteria to grow.      •Vaginal estrogen may be recommended. This medicine helps prevent UTIs in women who have gone through menopause or are in buzz-menopause.      •Do pelvic muscle exercises often. Pelvic muscle exercises may help you start and stop urinating. Strong pelvic muscles may help you empty your bladder easier. Squeeze these muscles tightly for 5 seconds like you are trying to hold back urine. Then relax for 5 seconds. Gradually work up to squeezing for 10 seconds. Do 3 sets of 15 repetitions a day, or as directed.         © Copyright [employer] 2021           back to top                          © Copyright [employer] 2021

## 2021-05-21 NOTE — ED PROVIDER NOTE - PATIENT PORTAL LINK FT
You can access the FollowMyHealth Patient Portal offered by [facility] by registering at the following website: [email or web address]. By joining [employer]’s FollowMyHealth portal, you will also be able to view your health information using other applications (apps) compatible with our system.

## 2021-05-21 NOTE — ED PROVIDER NOTE - CARE PLAN
Principal Discharge DX:	Acute abdominal pain  Secondary Diagnosis:	Acute cystitis without hematuria

## 2021-05-22 LAB
CULTURE RESULTS: SIGNIFICANT CHANGE UP
SPECIMEN SOURCE: SIGNIFICANT CHANGE UP

## 2021-08-20 NOTE — ED PROVIDER NOTE - CROS ED CARDIOVAS ALL NEG
"Chief Complaint   Patient presents with     UTI     started about 3 days ago     Patient stated he is having burning with urination and feels as if his flow has slowed down. Hx of kidney stones.    Initial BP (!) 152/94 (BP Location: Left arm, Patient Position: Sitting, Cuff Size: Adult Regular)   Pulse 56   Temp 96.9  F (36.1  C) (Tympanic)   Resp 12   Ht 1.753 m (5' 9\")   Wt 65.6 kg (144 lb 9.6 oz)   BMI 21.35 kg/m   Estimated body mass index is 21.35 kg/m  as calculated from the following:    Height as of this encounter: 1.753 m (5' 9\").    Weight as of this encounter: 65.6 kg (144 lb 9.6 oz).  Medication Reconciliation: Completed     Advanced Care Directive Reviewed    Qamar Messer LPN  " negative...

## 2025-03-14 NOTE — DISCHARGE NOTE NURSING/CASE MANAGEMENT/SOCIAL WORK - NSDCPETBCESMAN_GEN_ALL_CORE
Recent Labs     03/12/25  0137 03/13/25  0424 03/14/25  0520   INR 5.88* 2.81* 2.12*      Outpatient: 2.5mg Warfarin (Sun, Tues, Wed, Fri) and 5mg (Mon, Thurs, Sat)  Patient with supratherapeutic INR in the past 4 days or so, warfarin was held.  Per pulmonology recommendations, warfarin was held in case patient will need tPA once INR less than 2.  Patient is pending CT chest.  If CT chest will show persistent pleural effusion, will likely start patient on heparin drip, that will be stopped tomorrow morning if INR is less than 2 and resumed hours after tPA if no bleeding, per pulmonology.   If you are a smoker, it is important for your health to stop smoking. Please be aware that second hand smoke is also harmful.